# Patient Record
Sex: MALE | Race: OTHER | Employment: FULL TIME | URBAN - METROPOLITAN AREA
[De-identification: names, ages, dates, MRNs, and addresses within clinical notes are randomized per-mention and may not be internally consistent; named-entity substitution may affect disease eponyms.]

---

## 2017-01-04 ENCOUNTER — TELEPHONE (OUTPATIENT)
Dept: INTERNAL MEDICINE CLINIC | Facility: CLINIC | Age: 52
End: 2017-01-04

## 2017-01-04 NOTE — TELEPHONE ENCOUNTER
Patient is no longer taking pravastatin he is taking crestor.  Called [facility] and told them to cancel pravastatin

## 2017-01-10 ENCOUNTER — OFFICE VISIT (OUTPATIENT)
Dept: SURGERY | Facility: CLINIC | Age: 52
End: 2017-01-10

## 2017-01-10 VITALS
TEMPERATURE: 98 F | BODY MASS INDEX: 27.63 KG/M2 | WEIGHT: 193 LBS | SYSTOLIC BLOOD PRESSURE: 126 MMHG | RESPIRATION RATE: 16 BRPM | DIASTOLIC BLOOD PRESSURE: 82 MMHG | HEART RATE: 77 BPM | HEIGHT: 70 IN

## 2017-01-10 DIAGNOSIS — K40.90 RIGHT INGUINAL HERNIA: Primary | ICD-10-CM

## 2017-01-10 PROCEDURE — 99024 POSTOP FOLLOW-UP VISIT: CPT | Performed by: SURGERY
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
## 2017-01-23 ENCOUNTER — OFFICE VISIT (OUTPATIENT)
Dept: SURGERY | Facility: CLINIC | Age: 52
End: 2017-01-23

## 2017-01-23 VITALS
RESPIRATION RATE: 16 BRPM | BODY MASS INDEX: 27.63 KG/M2 | HEIGHT: 70 IN | DIASTOLIC BLOOD PRESSURE: 79 MMHG | HEART RATE: 85 BPM | SYSTOLIC BLOOD PRESSURE: 121 MMHG | WEIGHT: 193 LBS

## 2017-01-23 DIAGNOSIS — K40.90 RIGHT INGUINAL HERNIA: Primary | ICD-10-CM

## 2017-01-23 PROCEDURE — 99024 POSTOP FOLLOW-UP VISIT: CPT | Performed by: SURGERY
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
## 2017-02-03 ENCOUNTER — OFFICE VISIT (OUTPATIENT)
Dept: SURGERY | Facility: CLINIC | Age: 52
End: 2017-02-03

## 2017-02-03 VITALS
DIASTOLIC BLOOD PRESSURE: 86 MMHG | SYSTOLIC BLOOD PRESSURE: 130 MMHG | RESPIRATION RATE: 16 BRPM | WEIGHT: 193 LBS | BODY MASS INDEX: 27.63 KG/M2 | TEMPERATURE: 98 F | HEART RATE: 84 BPM | HEIGHT: 70 IN

## 2017-02-03 DIAGNOSIS — K40.90 RIGHT INGUINAL HERNIA: Primary | ICD-10-CM

## 2017-02-03 PROCEDURE — 99024 POSTOP FOLLOW-UP VISIT: CPT | Performed by: SURGERY
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
## 2017-05-11 ENCOUNTER — OFFICE VISIT (OUTPATIENT)
Dept: INTERNAL MEDICINE CLINIC | Facility: CLINIC | Age: 52
End: 2017-05-11

## 2017-05-11 ENCOUNTER — LAB ENCOUNTER (OUTPATIENT)
Dept: LAB | Age: 52
End: 2017-05-11
Attending: INTERNAL MEDICINE
Payer: COMMERCIAL

## 2017-05-11 ENCOUNTER — HOSPITAL ENCOUNTER (OUTPATIENT)
Dept: GENERAL RADIOLOGY | Age: 52
Discharge: HOME | End: 2017-05-11
Attending: INTERNAL MEDICINE
Payer: COMMERCIAL

## 2017-05-11 VITALS
TEMPERATURE: 98 F | DIASTOLIC BLOOD PRESSURE: 74 MMHG | BODY MASS INDEX: 27.63 KG/M2 | SYSTOLIC BLOOD PRESSURE: 134 MMHG | RESPIRATION RATE: 16 BRPM | HEART RATE: 80 BPM | HEIGHT: 70 IN | WEIGHT: 193 LBS

## 2017-05-11 DIAGNOSIS — I10 ESSENTIAL HYPERTENSION: ICD-10-CM

## 2017-05-11 DIAGNOSIS — E78.00 PURE HYPERCHOLESTEROLEMIA: ICD-10-CM

## 2017-05-11 DIAGNOSIS — R07.89 OTHER CHEST PAIN: ICD-10-CM

## 2017-05-11 DIAGNOSIS — R00.2 PALPITATIONS: ICD-10-CM

## 2017-05-11 DIAGNOSIS — I10 ESSENTIAL HYPERTENSION: Primary | ICD-10-CM

## 2017-05-11 PROCEDURE — 99214 OFFICE O/P EST MOD 30 MIN: CPT | Performed by: INTERNAL MEDICINE

## 2017-05-11 PROCEDURE — 71020 XR CHEST PA + LAT CHEST (CPT=71020): CPT | Performed by: INTERNAL MEDICINE

## 2017-05-11 PROCEDURE — 80053 COMPREHEN METABOLIC PANEL: CPT

## 2017-05-11 PROCEDURE — 85025 COMPLETE CBC W/AUTO DIFF WBC: CPT

## 2017-05-11 PROCEDURE — 93000 ELECTROCARDIOGRAM COMPLETE: CPT | Performed by: INTERNAL MEDICINE

## 2017-05-11 PROCEDURE — 83735 ASSAY OF MAGNESIUM: CPT

## 2017-05-11 NOTE — PROGRESS NOTES
Patient presents with:  Chest Pressure: and muscle spams going on for 1 week on and off.        HPI:  Here for vague chest discomfort started after working out, doing a plank and reaching back with his right arm, has mild right upper pectoral and sternal di Eyes: Conjunctivae wnl. Neck: Normal range of motion. Neck supple. Normal carotid pulses. No masses. Cardiovascular: Normal rate, regular rhythm and intact distal pulses. No murmur, rubs or gallops.    Pulmonary/Chest: Effort normal and breath sounds

## 2017-05-12 ENCOUNTER — TELEPHONE (OUTPATIENT)
Dept: INTERNAL MEDICINE CLINIC | Facility: CLINIC | Age: 52
End: 2017-05-12

## 2017-05-12 NOTE — TELEPHONE ENCOUNTER
Called pt to inform noted CARD ECHO 2D DOPPLER- referral #[id number]- AUTHORIZED. Spoke with Jesus Rodriguez in Referral and advised no pre-cert initiated for CARD MONITOR HOLTER 24 HOUR due to not needed for this procedure. Pt verbalized understanding.

## 2017-06-09 ENCOUNTER — TELEPHONE (OUTPATIENT)
Dept: INTERNAL MEDICINE CLINIC | Facility: CLINIC | Age: 52
End: 2017-06-09

## 2017-06-09 NOTE — TELEPHONE ENCOUNTER
Order form for compression socks/orthopedic footwear was received from Kareem Waters. Left detailed message ok per Hipaa to have pt cb and confirm he requested compression socks and orthopedic footwear.

## 2017-06-12 NOTE — TELEPHONE ENCOUNTER
Pt called back and stated this is a renewal. Yes he needs the compression socks and orthopedic footwear.

## 2017-08-31 ENCOUNTER — HOSPITAL ENCOUNTER (OUTPATIENT)
Dept: CV DIAGNOSTICS | Facility: HOSPITAL | Age: 52
Discharge: HOME | End: 2017-08-31
Attending: INTERNAL MEDICINE
Payer: COMMERCIAL

## 2017-08-31 DIAGNOSIS — R07.89 OTHER CHEST PAIN: ICD-10-CM

## 2017-08-31 DIAGNOSIS — R00.2 PALPITATIONS: ICD-10-CM

## 2017-08-31 DIAGNOSIS — I10 ESSENTIAL HYPERTENSION: ICD-10-CM

## 2017-08-31 DIAGNOSIS — E78.00 PURE HYPERCHOLESTEROLEMIA: ICD-10-CM

## 2017-08-31 PROCEDURE — 93306 TTE W/DOPPLER COMPLETE: CPT | Performed by: INTERNAL MEDICINE

## 2017-08-31 PROCEDURE — 93226 XTRNL ECG REC<48 HR SCAN A/R: CPT | Performed by: INTERNAL MEDICINE

## 2017-08-31 PROCEDURE — 93225 XTRNL ECG REC<48 HRS REC: CPT | Performed by: INTERNAL MEDICINE

## 2017-08-31 PROCEDURE — 93227 XTRNL ECG REC<48 HR R&I: CPT | Performed by: INTERNAL MEDICINE

## 2017-09-08 ENCOUNTER — OFFICE VISIT (OUTPATIENT)
Dept: INTERNAL MEDICINE CLINIC | Facility: CLINIC | Age: 52
End: 2017-09-08

## 2017-09-08 VITALS
TEMPERATURE: 98 F | DIASTOLIC BLOOD PRESSURE: 80 MMHG | RESPIRATION RATE: 16 BRPM | WEIGHT: 196.38 LBS | BODY MASS INDEX: 28.12 KG/M2 | SYSTOLIC BLOOD PRESSURE: 132 MMHG | HEIGHT: 70 IN | HEART RATE: 86 BPM

## 2017-09-08 DIAGNOSIS — Z12.5 SCREENING PSA (PROSTATE SPECIFIC ANTIGEN): ICD-10-CM

## 2017-09-08 DIAGNOSIS — E78.00 PURE HYPERCHOLESTEROLEMIA: Primary | ICD-10-CM

## 2017-09-08 DIAGNOSIS — R07.89 STERNAL PAIN: ICD-10-CM

## 2017-09-08 DIAGNOSIS — R73.9 HYPERGLYCEMIA: ICD-10-CM

## 2017-09-08 DIAGNOSIS — I10 ESSENTIAL HYPERTENSION: ICD-10-CM

## 2017-09-08 DIAGNOSIS — Z00.00 BLOOD TESTS FOR ROUTINE GENERAL PHYSICAL EXAMINATION: ICD-10-CM

## 2017-09-08 PROCEDURE — 99213 OFFICE O/P EST LOW 20 MIN: CPT | Performed by: INTERNAL MEDICINE

## 2017-09-08 PROCEDURE — 90471 IMMUNIZATION ADMIN: CPT | Performed by: INTERNAL MEDICINE

## 2017-09-08 PROCEDURE — 90686 IIV4 VACC NO PRSV 0.5 ML IM: CPT | Performed by: INTERNAL MEDICINE

## 2017-09-08 RX ORDER — NAPROXEN 500 MG/1
500 TABLET ORAL 2 TIMES DAILY WITH MEALS
Qty: 20 TABLET | Refills: 0 | Status: SHIPPED | OUTPATIENT
Start: 2017-09-08 | End: 2018-03-07 | Stop reason: ALTCHOICE

## 2017-09-08 NOTE — PROGRESS NOTES
Patient presents with: Follow - Up: ROOM 8/ discuss echo results/ left knee pain x1-2 months- overexerted on treadmill/ flu shot given      HPI:  Here for f/u high chol, stable dongk well taking meds no se's.  Pt notes persitent mild pain at sternum, neg c 28.18 kg/m²   Constitutional: Oriented to person, place, and time. No distress. HEENT:  Normocephalic and atraumatic. TM's wnl. Nose normal. Oropharynx is clear and moist.   Eyes: Conjunctivae wnl. Neck: Normal range of motion. Neck supple.  Normal parrish

## 2017-09-13 ENCOUNTER — HOSPITAL ENCOUNTER (OUTPATIENT)
Dept: GENERAL RADIOLOGY | Age: 52
Discharge: HOME | End: 2017-09-13
Attending: INTERNAL MEDICINE
Payer: COMMERCIAL

## 2017-09-13 ENCOUNTER — LAB ENCOUNTER (OUTPATIENT)
Dept: LAB | Age: 52
End: 2017-09-13
Attending: INTERNAL MEDICINE
Payer: COMMERCIAL

## 2017-09-13 DIAGNOSIS — Z12.5 SCREENING PSA (PROSTATE SPECIFIC ANTIGEN): ICD-10-CM

## 2017-09-13 DIAGNOSIS — Z00.00 BLOOD TESTS FOR ROUTINE GENERAL PHYSICAL EXAMINATION: ICD-10-CM

## 2017-09-13 DIAGNOSIS — R73.9 HYPERGLYCEMIA: ICD-10-CM

## 2017-09-13 DIAGNOSIS — E78.00 PURE HYPERCHOLESTEROLEMIA: ICD-10-CM

## 2017-09-13 DIAGNOSIS — I10 ESSENTIAL HYPERTENSION: ICD-10-CM

## 2017-09-13 DIAGNOSIS — R07.89 STERNAL PAIN: ICD-10-CM

## 2017-09-13 LAB
ALBUMIN SERPL-MCNC: 4 G/DL (ref 3.5–4.8)
ALP LIVER SERPL-CCNC: 81 U/L (ref 45–117)
ALT SERPL-CCNC: 33 U/L (ref 17–63)
AST SERPL-CCNC: 20 U/L (ref 15–41)
BASOPHILS # BLD AUTO: 0.09 X10(3) UL (ref 0–0.1)
BASOPHILS NFR BLD AUTO: 1.1 %
BILIRUB SERPL-MCNC: 0.5 MG/DL (ref 0.1–2)
BUN BLD-MCNC: 19 MG/DL (ref 8–20)
CALCIUM BLD-MCNC: 8.9 MG/DL (ref 8.3–10.3)
CHLORIDE: 107 MMOL/L (ref 101–111)
CHOLEST SMN-MCNC: 149 MG/DL (ref ?–200)
CO2: 25 MMOL/L (ref 22–32)
COMPLEXED PSA SERPL-MCNC: 0.64 NG/ML (ref 0.01–4)
CREAT BLD-MCNC: 0.9 MG/DL (ref 0.7–1.3)
EOSINOPHIL # BLD AUTO: 0.33 X10(3) UL (ref 0–0.3)
EOSINOPHIL NFR BLD AUTO: 4.1 %
ERYTHROCYTE [DISTWIDTH] IN BLOOD BY AUTOMATED COUNT: 13 % (ref 11.5–16)
EST. AVERAGE GLUCOSE BLD GHB EST-MCNC: 128 MG/DL (ref 68–126)
GLUCOSE BLD-MCNC: 89 MG/DL (ref 70–99)
HBA1C MFR BLD HPLC: 6.1 % (ref ?–5.7)
HCT VFR BLD AUTO: 45.4 % (ref 37–53)
HDLC SERPL-MCNC: 52 MG/DL (ref 45–?)
HDLC SERPL: 2.87 {RATIO} (ref ?–4.97)
HGB BLD-MCNC: 15.4 G/DL (ref 13–17)
IMMATURE GRANULOCYTE COUNT: 0.04 X10(3) UL (ref 0–1)
IMMATURE GRANULOCYTE RATIO %: 0.5 %
LDLC SERPL CALC-MCNC: 58 MG/DL (ref ?–130)
LDLC SERPL-MCNC: 39 MG/DL (ref 5–40)
LYMPHOCYTES # BLD AUTO: 2.11 X10(3) UL (ref 0.9–4)
LYMPHOCYTES NFR BLD AUTO: 26.4 %
M PROTEIN MFR SERPL ELPH: 7.4 G/DL (ref 6.1–8.3)
MCH RBC QN AUTO: 30.1 PG (ref 27–33.2)
MCHC RBC AUTO-ENTMCNC: 33.9 G/DL (ref 31–37)
MCV RBC AUTO: 88.7 FL (ref 80–99)
MONOCYTES # BLD AUTO: 0.63 X10(3) UL (ref 0.1–0.6)
MONOCYTES NFR BLD AUTO: 7.9 %
NEUTROPHIL ABS PRELIM: 4.8 X10 (3) UL (ref 1.3–6.7)
NEUTROPHILS # BLD AUTO: 4.8 X10(3) UL (ref 1.3–6.7)
NEUTROPHILS NFR BLD AUTO: 60 %
NONHDLC SERPL-MCNC: 97 MG/DL (ref ?–130)
PLATELET # BLD AUTO: 259 10(3)UL (ref 150–450)
POTASSIUM SERPL-SCNC: 3.9 MMOL/L (ref 3.6–5.1)
RBC # BLD AUTO: 5.12 X10(6)UL (ref 4.3–5.7)
RED CELL DISTRIBUTION WIDTH-SD: 42.5 FL (ref 35.1–46.3)
SODIUM SERPL-SCNC: 140 MMOL/L (ref 136–144)
TRIGLYCERIDES: 196 MG/DL (ref ?–150)
TSI SER-ACNC: 2.36 MIU/ML (ref 0.35–5.5)
WBC # BLD AUTO: 8 X10(3) UL (ref 4–13)

## 2017-09-13 PROCEDURE — 80053 COMPREHEN METABOLIC PANEL: CPT

## 2017-09-13 PROCEDURE — 71120 X-RAY EXAM BREASTBONE 2/>VWS: CPT | Performed by: INTERNAL MEDICINE

## 2017-09-13 PROCEDURE — 85025 COMPLETE CBC W/AUTO DIFF WBC: CPT

## 2017-09-13 PROCEDURE — 36415 COLL VENOUS BLD VENIPUNCTURE: CPT

## 2017-09-13 PROCEDURE — 83036 HEMOGLOBIN GLYCOSYLATED A1C: CPT

## 2017-09-13 PROCEDURE — 80061 LIPID PANEL: CPT

## 2017-09-13 PROCEDURE — 84443 ASSAY THYROID STIM HORMONE: CPT

## 2017-09-14 DIAGNOSIS — R73.9 HYPERGLYCEMIA: ICD-10-CM

## 2017-09-14 DIAGNOSIS — E78.00 PURE HYPERCHOLESTEROLEMIA: Primary | ICD-10-CM

## 2017-09-14 DIAGNOSIS — R07.89 STERNAL PAIN: Primary | ICD-10-CM

## 2017-09-14 NOTE — PROGRESS NOTES
Trigs and sugar up a bit. Needs to improved diet, lower fat and sugar/carbs.  Rpt cmp, lipid a1c in 6mos

## 2017-09-20 RX ORDER — PRAVASTATIN SODIUM 20 MG
TABLET ORAL
Qty: 90 TABLET | Refills: 0 | Status: SHIPPED | OUTPATIENT
Start: 2017-09-20 | End: 2017-11-30

## 2017-09-25 ENCOUNTER — TELEPHONE (OUTPATIENT)
Dept: INTERNAL MEDICINE CLINIC | Facility: CLINIC | Age: 52
End: 2017-09-25

## 2017-09-25 DIAGNOSIS — Z87.19 HISTORY OF DIVERTICULOSIS: ICD-10-CM

## 2017-09-25 DIAGNOSIS — Z87.19 HISTORY OF HEMORRHOIDS: ICD-10-CM

## 2017-09-25 DIAGNOSIS — Z12.11 SCREEN FOR COLON CANCER: Primary | ICD-10-CM

## 2017-09-25 NOTE — TELEPHONE ENCOUNTER
Patient notified CT Chest denied. Pt states he is still having the intermittent pain, finished the Naprosyn x 10 days without any change, is still working out 2-4 days per week, low impact, lifting weights above head but nothing above 15 lbs per arm.   Pt

## 2017-09-25 NOTE — TELEPHONE ENCOUNTER
Talbert Simmonds, MD - FW: Peer to peer << Less Detail',event)\" href=\"javascript:;\">More Detail >>      FW: Peer to peer    Talbert Simmonds, MD   Sent: Mon September 25, 2017  7:34 AM   To: [facility] Clinical Staff                  Message     CT was bladimir

## 2017-11-30 ENCOUNTER — OFFICE VISIT (OUTPATIENT)
Dept: SURGERY | Facility: CLINIC | Age: 52
End: 2017-11-30

## 2017-11-30 VITALS
DIASTOLIC BLOOD PRESSURE: 73 MMHG | HEART RATE: 68 BPM | HEIGHT: 70 IN | TEMPERATURE: 99 F | WEIGHT: 196 LBS | BODY MASS INDEX: 28.06 KG/M2 | SYSTOLIC BLOOD PRESSURE: 110 MMHG

## 2017-11-30 DIAGNOSIS — K57.30 DIVERTICULOSIS OF LARGE INTESTINE WITHOUT HEMORRHAGE: ICD-10-CM

## 2017-11-30 DIAGNOSIS — Z12.11 ENCOUNTER FOR SCREENING COLONOSCOPY: Primary | ICD-10-CM

## 2017-11-30 PROCEDURE — 99244 OFF/OP CNSLTJ NEW/EST MOD 40: CPT | Performed by: COLON & RECTAL SURGERY

## 2017-11-30 RX ORDER — POLYETHYLENE GLYCOL 3350, SODIUM CHLORIDE, SODIUM BICARBONATE, POTASSIUM CHLORIDE 420; 11.2; 5.72; 1.48 G/4L; G/4L; G/4L; G/4L
POWDER, FOR SOLUTION ORAL
Qty: 1 BOTTLE | Refills: 0 | Status: SHIPPED | OUTPATIENT
Start: 2017-11-30 | End: 2018-01-18 | Stop reason: ALTCHOICE

## 2017-11-30 RX ORDER — LISINOPRIL 5 MG/1
5 TABLET ORAL DAILY
COMMUNITY
Start: 2017-11-15 | End: 2018-03-02

## 2017-11-30 NOTE — PATIENT INSTRUCTIONS
Assessment   Encounter for screening colonoscopy  (primary encounter diagnosis)  Diverticulosis of large intestine without hemorrhage      Plan   The benefits of colonoscopy, including detection of cancer and polyp removal prior to progression to cancer we
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
## 2017-12-08 ENCOUNTER — TELEPHONE (OUTPATIENT)
Dept: INTERNAL MEDICINE CLINIC | Facility: CLINIC | Age: 52
End: 2017-12-08

## 2017-12-19 ENCOUNTER — LABORATORY ENCOUNTER (OUTPATIENT)
Dept: LAB | Age: 52
End: 2017-12-19
Attending: COLON & RECTAL SURGERY
Payer: COMMERCIAL

## 2017-12-19 DIAGNOSIS — Z12.11 COLON CANCER SCREENING: Primary | ICD-10-CM

## 2017-12-19 PROCEDURE — 88305 TISSUE EXAM BY PATHOLOGIST: CPT

## 2018-01-15 ENCOUNTER — MED REC SCAN ONLY (OUTPATIENT)
Dept: INTERNAL MEDICINE CLINIC | Facility: CLINIC | Age: 53
End: 2018-01-15

## 2018-01-19 ENCOUNTER — LAB ENCOUNTER (OUTPATIENT)
Dept: LAB | Age: 53
End: 2018-01-19
Attending: INTERNAL MEDICINE
Payer: COMMERCIAL

## 2018-01-19 ENCOUNTER — OFFICE VISIT (OUTPATIENT)
Dept: INTERNAL MEDICINE CLINIC | Facility: CLINIC | Age: 53
End: 2018-01-19

## 2018-01-19 ENCOUNTER — TELEPHONE (OUTPATIENT)
Dept: INTERNAL MEDICINE CLINIC | Facility: CLINIC | Age: 53
End: 2018-01-19

## 2018-01-19 VITALS
RESPIRATION RATE: 16 BRPM | DIASTOLIC BLOOD PRESSURE: 80 MMHG | HEART RATE: 83 BPM | SYSTOLIC BLOOD PRESSURE: 120 MMHG | TEMPERATURE: 98 F | WEIGHT: 192.81 LBS | OXYGEN SATURATION: 99 % | BODY MASS INDEX: 27.6 KG/M2 | HEIGHT: 70 IN

## 2018-01-19 DIAGNOSIS — E78.00 PURE HYPERCHOLESTEROLEMIA: ICD-10-CM

## 2018-01-19 DIAGNOSIS — I10 ESSENTIAL HYPERTENSION: ICD-10-CM

## 2018-01-19 DIAGNOSIS — M19.012 ARTHRITIS OF LEFT SHOULDER REGION: Primary | ICD-10-CM

## 2018-01-19 DIAGNOSIS — Z01.818 PRE-OP EXAMINATION: ICD-10-CM

## 2018-01-19 LAB
BUN BLD-MCNC: 15 MG/DL (ref 8–20)
CALCIUM BLD-MCNC: 8.9 MG/DL (ref 8.3–10.3)
CHLORIDE: 107 MMOL/L (ref 101–111)
CO2: 25 MMOL/L (ref 22–32)
CREAT BLD-MCNC: 0.97 MG/DL (ref 0.7–1.3)
GLUCOSE BLD-MCNC: 92 MG/DL (ref 70–99)
POTASSIUM SERPL-SCNC: 3.7 MMOL/L (ref 3.6–5.1)
SODIUM SERPL-SCNC: 140 MMOL/L (ref 136–144)

## 2018-01-19 PROCEDURE — 93000 ELECTROCARDIOGRAM COMPLETE: CPT | Performed by: INTERNAL MEDICINE

## 2018-01-19 PROCEDURE — 80048 BASIC METABOLIC PNL TOTAL CA: CPT

## 2018-01-19 PROCEDURE — 99214 OFFICE O/P EST MOD 30 MIN: CPT | Performed by: INTERNAL MEDICINE
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
## 2018-01-22 ENCOUNTER — ANESTHESIA EVENT (OUTPATIENT)
Dept: SURGERY | Facility: HOSPITAL | Age: 53
End: 2018-01-22
Payer: OTHER MISCELLANEOUS

## 2018-01-23 ENCOUNTER — SURGERY (OUTPATIENT)
Age: 53
End: 2018-01-23

## 2018-01-23 ENCOUNTER — ANESTHESIA (OUTPATIENT)
Dept: SURGERY | Facility: HOSPITAL | Age: 53
End: 2018-01-23
Payer: OTHER MISCELLANEOUS

## 2018-01-23 ENCOUNTER — HOSPITAL ENCOUNTER (OUTPATIENT)
Facility: HOSPITAL | Age: 53
Setting detail: HOSPITAL OUTPATIENT SURGERY
Discharge: HOME | End: 2018-01-23
Attending: ORTHOPAEDIC SURGERY | Admitting: ORTHOPAEDIC SURGERY
Payer: OTHER MISCELLANEOUS

## 2018-01-23 VITALS
HEIGHT: 70 IN | DIASTOLIC BLOOD PRESSURE: 82 MMHG | BODY MASS INDEX: 27.46 KG/M2 | HEART RATE: 85 BPM | RESPIRATION RATE: 16 BRPM | WEIGHT: 191.81 LBS | SYSTOLIC BLOOD PRESSURE: 125 MMHG | OXYGEN SATURATION: 98 % | TEMPERATURE: 98 F

## 2018-01-23 PROCEDURE — 3E0T3BZ INTRODUCTION OF ANESTHETIC AGENT INTO PERIPHERAL NERVES AND PLEXI, PERCUTANEOUS APPROACH: ICD-10-PCS | Performed by: ANESTHESIOLOGY

## 2018-01-23 PROCEDURE — 76942 ECHO GUIDE FOR BIOPSY: CPT | Performed by: ORTHOPAEDIC SURGERY

## 2018-01-23 PROCEDURE — 0PBB4ZZ EXCISION OF LEFT CLAVICLE, PERCUTANEOUS ENDOSCOPIC APPROACH: ICD-10-PCS | Performed by: ORTHOPAEDIC SURGERY

## 2018-01-23 PROCEDURE — 0RNK4ZZ RELEASE LEFT SHOULDER JOINT, PERCUTANEOUS ENDOSCOPIC APPROACH: ICD-10-PCS | Performed by: ORTHOPAEDIC SURGERY

## 2018-01-23 RX ORDER — NALOXONE HYDROCHLORIDE 0.4 MG/ML
80 INJECTION, SOLUTION INTRAMUSCULAR; INTRAVENOUS; SUBCUTANEOUS AS NEEDED
Status: DISCONTINUED | OUTPATIENT
Start: 2018-01-23 | End: 2018-01-23

## 2018-01-23 RX ORDER — POVIDONE-IODINE 10 MG/G
OINTMENT TOPICAL AS NEEDED
Status: DISCONTINUED | OUTPATIENT
Start: 2018-01-23 | End: 2018-01-23 | Stop reason: HOSPADM

## 2018-01-23 RX ORDER — HYDROCODONE BITARTRATE AND ACETAMINOPHEN 5; 325 MG/1; MG/1
1 TABLET ORAL AS NEEDED
Status: DISCONTINUED | OUTPATIENT
Start: 2018-01-23 | End: 2018-01-23

## 2018-01-23 RX ORDER — HYDROCODONE BITARTRATE AND ACETAMINOPHEN 5; 325 MG/1; MG/1
2 TABLET ORAL AS NEEDED
Status: DISCONTINUED | OUTPATIENT
Start: 2018-01-23 | End: 2018-01-23

## 2018-01-23 RX ORDER — HYDROMORPHONE HYDROCHLORIDE 1 MG/ML
0.4 INJECTION, SOLUTION INTRAMUSCULAR; INTRAVENOUS; SUBCUTANEOUS EVERY 5 MIN PRN
Status: DISCONTINUED | OUTPATIENT
Start: 2018-01-23 | End: 2018-01-23

## 2018-01-23 RX ORDER — ONDANSETRON 2 MG/ML
4 INJECTION INTRAMUSCULAR; INTRAVENOUS AS NEEDED
Status: DISCONTINUED | OUTPATIENT
Start: 2018-01-23 | End: 2018-01-23

## 2018-01-23 RX ORDER — MEPERIDINE HYDROCHLORIDE 25 MG/ML
12.5 INJECTION INTRAMUSCULAR; INTRAVENOUS; SUBCUTANEOUS AS NEEDED
Status: DISCONTINUED | OUTPATIENT
Start: 2018-01-23 | End: 2018-01-23

## 2018-01-23 RX ORDER — CEFAZOLIN SODIUM 1 G/3ML
INJECTION, POWDER, FOR SOLUTION INTRAMUSCULAR; INTRAVENOUS
Status: DISCONTINUED | OUTPATIENT
Start: 2018-01-23 | End: 2018-01-23 | Stop reason: HOSPADM

## 2018-01-23 RX ORDER — ACETAMINOPHEN 500 MG
1000 TABLET ORAL ONCE AS NEEDED
Status: DISCONTINUED | OUTPATIENT
Start: 2018-01-23 | End: 2018-01-23

## 2018-01-23 RX ORDER — CEFAZOLIN SODIUM/WATER 2 G/20 ML
2 SYRINGE (ML) INTRAVENOUS ONCE
Status: DISCONTINUED | OUTPATIENT
Start: 2018-01-23 | End: 2018-01-23 | Stop reason: HOSPADM

## 2018-01-23 RX ORDER — SODIUM CHLORIDE, SODIUM LACTATE, POTASSIUM CHLORIDE, CALCIUM CHLORIDE 600; 310; 30; 20 MG/100ML; MG/100ML; MG/100ML; MG/100ML
INJECTION, SOLUTION INTRAVENOUS CONTINUOUS
Status: DISCONTINUED | OUTPATIENT
Start: 2018-01-23 | End: 2018-01-23

## 2018-01-23 RX ORDER — MIDAZOLAM HYDROCHLORIDE 1 MG/ML
1 INJECTION INTRAMUSCULAR; INTRAVENOUS EVERY 5 MIN PRN
Status: DISCONTINUED | OUTPATIENT
Start: 2018-01-23 | End: 2018-01-23
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
## 2018-01-23 NOTE — H&P
Orthopaedic H&P Update    H&P performed by Dr Krystal Payne on 1/19/18 was reviewed by Arianna Pearson MD on 1/23/2018, the patient was examined and no significant changes have occurred in the patient's condition since the H&P was performed.   Risks and benefits w

## 2018-01-23 NOTE — ANESTHESIA POSTPROCEDURE EVALUATION
[facility] Patient Status:  Hospital Outpatient Surgery   Age/Gender 47year old male MRN [id number]   [facility] SURGERY Attending Keri Gonsalves MD   Hosp Day # 0 PCP Marilu Swift MD       Anesthesia Post-op Note

## 2018-01-23 NOTE — BRIEF OP NOTE
Pre-Operative Diagnosis: LEFT SHOULDER SLAP TEAR, IMPINGEMENT, AC JOINT ARTHRITIS       Post-Operative Diagnosis: TYPE 1 SUPERIOR AND ANTERIOR LABRIUM TEAR, A/C JOINT ARTHRITIS, ROTATOR CUFF IMPINGEMENT     Procedure Performed:   Procedure(s):  LEFT MO

## 2018-01-24 NOTE — OPERATIVE REPORT
[facility]    PATIENT'S NAME: Nida Rodriguez   ATTENDING PHYSICIAN: Quinn Das M.D. OPERATING PHYSICIAN: Quinn Das M.D.    PATIENT ACCOUNT#:   [de-identified]    LOCATION:  PREOPASCC EH PRE ASCC 2 EDWP 10  MEDICAL RECORD #:   [id number]       DATE OF therefore we discussed options for arthroscopic treatment. We discussed the nature of the procedure along with its typical postoperative course.   Risks, benefits, and alternatives of surgery were discussed, including but not limited to possible infection, posterior to the biceps there was no significant peel-off but only type 1 labral involvement.   A 4.0 synovial resector was therefore used to debride the labrum to a stable surface superiorly, anteriorly, and extending posteriorly only to about the 1 o'cloc with a CoolPulse VAPR device exposing the anterior third of the acromion. Based on the MRI as a template, I used the anterolateral cutting block technique to debride about 4 mm of acromion laterally and about 6 mm medially where it was most prominent.   Edin Reeves

## 2018-02-02 ENCOUNTER — TELEPHONE (OUTPATIENT)
Dept: INTERNAL MEDICINE CLINIC | Facility: CLINIC | Age: 53
End: 2018-02-02

## 2018-02-02 ENCOUNTER — OFFICE VISIT (OUTPATIENT)
Dept: INTERNAL MEDICINE CLINIC | Facility: CLINIC | Age: 53
End: 2018-02-02

## 2018-02-02 VITALS
RESPIRATION RATE: 16 BRPM | TEMPERATURE: 99 F | WEIGHT: 194 LBS | SYSTOLIC BLOOD PRESSURE: 126 MMHG | DIASTOLIC BLOOD PRESSURE: 78 MMHG | HEART RATE: 66 BPM | BODY MASS INDEX: 28.73 KG/M2 | HEIGHT: 69 IN

## 2018-02-02 DIAGNOSIS — H01.004 BLEPHARITIS OF LEFT UPPER EYELID, UNSPECIFIED TYPE: Primary | ICD-10-CM

## 2018-02-02 PROCEDURE — 99213 OFFICE O/P EST LOW 20 MIN: CPT | Performed by: INTERNAL MEDICINE

## 2018-02-02 RX ORDER — HYDROCODONE BITARTRATE AND ACETAMINOPHEN 5; 325 MG/1; MG/1
TABLET ORAL
Refills: 0 | COMMUNITY
Start: 2018-01-23 | End: 2018-03-07 | Stop reason: ALTCHOICE

## 2018-02-02 RX ORDER — TOBRAMYCIN 3 MG/ML
2 SOLUTION/ DROPS OPHTHALMIC 3 TIMES DAILY
Qty: 5 ML | Refills: 0 | Status: SHIPPED | OUTPATIENT
Start: 2018-02-02 | End: 2018-10-16

## 2018-02-02 NOTE — PROGRESS NOTES
Patient presents with:  Pink Eye: ([id number])  left eye      HPI:  Here for redness itching ans omse clear discharge from left eye for one day. Swelling of the upper lid of left eye. Review of Systems   No f/c/chest pain or sob. No cough.  No abd pain/n/v/d Location: Right arm, Patient Position: Sitting, Cuff Size: large)   Pulse 66   Temp 98.5 °F (36.9 °C) (Oral)   Resp 16   Ht 69\"   Wt 194 lb   BMI 28.65 kg/m²   Constitutional: Oriented to person, place, and time. No distress.    HEENT:  Normocephalic and a

## 2018-02-24 RX ORDER — ROSUVASTATIN CALCIUM 10 MG/1
TABLET, COATED ORAL
Qty: 90 TABLET | Refills: 3 | Status: SHIPPED | OUTPATIENT
Start: 2018-02-24 | End: 2019-02-26

## 2018-03-02 RX ORDER — LISINOPRIL 5 MG/1
TABLET ORAL
Qty: 90 TABLET | Refills: 1 | Status: SHIPPED | OUTPATIENT
Start: 2018-03-02 | End: 2018-09-12

## 2018-03-06 ENCOUNTER — LAB ENCOUNTER (OUTPATIENT)
Dept: LAB | Age: 53
End: 2018-03-06
Attending: INTERNAL MEDICINE
Payer: COMMERCIAL

## 2018-03-06 DIAGNOSIS — E78.00 PURE HYPERCHOLESTEROLEMIA: ICD-10-CM

## 2018-03-06 DIAGNOSIS — R73.9 HYPERGLYCEMIA: ICD-10-CM

## 2018-03-06 LAB
ALBUMIN SERPL-MCNC: 4.3 G/DL (ref 3.5–4.8)
ALP LIVER SERPL-CCNC: 84 U/L (ref 45–117)
ALT SERPL-CCNC: 32 U/L (ref 17–63)
AST SERPL-CCNC: 17 U/L (ref 15–41)
BILIRUB SERPL-MCNC: 0.6 MG/DL (ref 0.1–2)
BUN BLD-MCNC: 19 MG/DL (ref 8–20)
CALCIUM BLD-MCNC: 9.4 MG/DL (ref 8.3–10.3)
CHLORIDE: 105 MMOL/L (ref 101–111)
CHOLEST SMN-MCNC: 156 MG/DL (ref ?–200)
CO2: 25 MMOL/L (ref 22–32)
CREAT BLD-MCNC: 0.91 MG/DL (ref 0.7–1.3)
EST. AVERAGE GLUCOSE BLD GHB EST-MCNC: 120 MG/DL (ref 68–126)
GLUCOSE BLD-MCNC: 92 MG/DL (ref 70–99)
HBA1C MFR BLD HPLC: 5.8 % (ref ?–5.7)
HDLC SERPL-MCNC: 53 MG/DL (ref 45–?)
HDLC SERPL: 2.94 {RATIO} (ref ?–4.97)
LDLC SERPL CALC-MCNC: 57 MG/DL (ref ?–130)
M PROTEIN MFR SERPL ELPH: 7.7 G/DL (ref 6.1–8.3)
NONHDLC SERPL-MCNC: 103 MG/DL (ref ?–130)
POTASSIUM SERPL-SCNC: 4.1 MMOL/L (ref 3.6–5.1)
SODIUM SERPL-SCNC: 138 MMOL/L (ref 136–144)
TRIGL SERPL-MCNC: 229 MG/DL (ref ?–150)
VLDLC SERPL CALC-MCNC: 46 MG/DL (ref 5–40)

## 2018-03-06 PROCEDURE — 80053 COMPREHEN METABOLIC PANEL: CPT

## 2018-03-06 PROCEDURE — 83036 HEMOGLOBIN GLYCOSYLATED A1C: CPT

## 2018-03-06 PROCEDURE — 80061 LIPID PANEL: CPT

## 2018-03-07 ENCOUNTER — OFFICE VISIT (OUTPATIENT)
Dept: INTERNAL MEDICINE CLINIC | Facility: CLINIC | Age: 53
End: 2018-03-07

## 2018-03-07 VITALS
DIASTOLIC BLOOD PRESSURE: 78 MMHG | BODY MASS INDEX: 28.88 KG/M2 | HEART RATE: 84 BPM | RESPIRATION RATE: 20 BRPM | HEIGHT: 69 IN | TEMPERATURE: 98 F | SYSTOLIC BLOOD PRESSURE: 124 MMHG | WEIGHT: 195 LBS

## 2018-03-07 DIAGNOSIS — R42 VERTIGO: Primary | ICD-10-CM

## 2018-03-07 PROCEDURE — 99213 OFFICE O/P EST LOW 20 MIN: CPT | Performed by: INTERNAL MEDICINE

## 2018-03-07 RX ORDER — MECLIZINE HYDROCHLORIDE 25 MG/1
25 TABLET ORAL 3 TIMES DAILY PRN
Qty: 42 TABLET | Refills: 0 | Status: SHIPPED | OUTPATIENT
Start: 2018-03-07 | End: 2018-03-21
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
## 2018-09-12 RX ORDER — LISINOPRIL 5 MG/1
TABLET ORAL
Qty: 90 TABLET | Refills: 1 | Status: SHIPPED | OUTPATIENT
Start: 2018-09-12 | End: 2019-05-23

## 2018-09-12 NOTE — TELEPHONE ENCOUNTER
Last Office Visit: 3-7-18 with AD for dizziness  Last Rx Filled: 3-2-18 90 tabs with 1 refill  Last Labs: 3-6-18 hga1c/lipid/cmp/  Future Appointment: none    Per protocol to provider

## 2018-10-05 ENCOUNTER — TELEPHONE (OUTPATIENT)
Dept: INTERNAL MEDICINE CLINIC | Facility: CLINIC | Age: 53
End: 2018-10-05

## 2018-10-05 NOTE — TELEPHONE ENCOUNTER
Wife is calling for  and they are wanting to know who [facility] would refer them to for Podiatry.   Please advise

## 2018-10-09 NOTE — TELEPHONE ENCOUNTER
Pt called back stating ingrown toe nails he tried to trim and cannot get to it-both large toes-no infection not bleeding just wants to see someone to help take care of ingrown toe nails-pt  and cannot get to his phone all the time so if you

## 2018-10-16 ENCOUNTER — OFFICE VISIT (OUTPATIENT)
Dept: PODIATRY CLINIC | Facility: CLINIC | Age: 53
End: 2018-10-16
Payer: COMMERCIAL

## 2018-10-16 ENCOUNTER — TELEPHONE (OUTPATIENT)
Dept: INTERNAL MEDICINE CLINIC | Facility: CLINIC | Age: 53
End: 2018-10-16

## 2018-10-16 ENCOUNTER — OFFICE VISIT (OUTPATIENT)
Dept: INTERNAL MEDICINE CLINIC | Facility: CLINIC | Age: 53
End: 2018-10-16
Payer: COMMERCIAL

## 2018-10-16 VITALS
BODY MASS INDEX: 28.38 KG/M2 | DIASTOLIC BLOOD PRESSURE: 80 MMHG | HEART RATE: 96 BPM | WEIGHT: 191.63 LBS | RESPIRATION RATE: 18 BRPM | TEMPERATURE: 99 F | SYSTOLIC BLOOD PRESSURE: 130 MMHG | HEIGHT: 69 IN

## 2018-10-16 DIAGNOSIS — I10 ESSENTIAL HYPERTENSION: ICD-10-CM

## 2018-10-16 DIAGNOSIS — R73.9 HYPERGLYCEMIA: ICD-10-CM

## 2018-10-16 DIAGNOSIS — Z00.00 BLOOD TESTS FOR ROUTINE GENERAL PHYSICAL EXAMINATION: Primary | ICD-10-CM

## 2018-10-16 DIAGNOSIS — Z12.5 SCREENING PSA (PROSTATE SPECIFIC ANTIGEN): ICD-10-CM

## 2018-10-16 DIAGNOSIS — Z13.220 SCREENING CHOLESTEROL LEVEL: ICD-10-CM

## 2018-10-16 DIAGNOSIS — M79.675 PAIN IN TOES OF BOTH FEET: ICD-10-CM

## 2018-10-16 DIAGNOSIS — L60.0 ONYCHOCRYPTOSIS: Primary | ICD-10-CM

## 2018-10-16 DIAGNOSIS — E78.00 PURE HYPERCHOLESTEROLEMIA: ICD-10-CM

## 2018-10-16 DIAGNOSIS — M79.674 PAIN IN TOES OF BOTH FEET: ICD-10-CM

## 2018-10-16 DIAGNOSIS — H00.15 CHALAZION LEFT LOWER EYELID: ICD-10-CM

## 2018-10-16 PROCEDURE — 99203 OFFICE O/P NEW LOW 30 MIN: CPT | Performed by: PODIATRIST

## 2018-10-16 PROCEDURE — 99213 OFFICE O/P EST LOW 20 MIN: CPT | Performed by: INTERNAL MEDICINE

## 2018-10-16 RX ORDER — TOBRAMYCIN 3 MG/ML
2 SOLUTION/ DROPS OPHTHALMIC 3 TIMES DAILY
Qty: 5 ML | Refills: 0 | Status: SHIPPED | OUTPATIENT
Start: 2018-10-16 | End: 2019-06-04 | Stop reason: ALTCHOICE

## 2018-10-16 NOTE — PROGRESS NOTES
Patient presents with:  Eye Problem: cn room 8: stye in eye that appeared on sunday used warm compress not bugging him as bad       HPI:  Here for left lower lid of eye redness with mild swelling, no pain, applying warm packs bid with some improvement. place, and time. No distress. HEENT:  Normocephalic and atraumatic. TM's wnl. Nose normal. Oropharynx is clear and moist. mild chalazion lower lid left eye, red sollen lower lid, mild  Eyes: Conjunctivae wnl. Neck: Normal range of motion. Neck supple.

## 2018-10-16 NOTE — TELEPHONE ENCOUNTER
Pt was in to see [facility] today for a stye in his eye and wants to know if its contagious. Please advise.  Pt states he may not answer the phone so we can leave a message

## 2018-10-16 NOTE — TELEPHONE ENCOUNTER
The drops PT was prescribed at today's appt are too expensive, he was prescribed a less expensive one previously, does not remember when or what the name of it was. Is asking if he could have that one called instead?

## 2018-10-16 NOTE — TELEPHONE ENCOUNTER
Pt was prescribed Azithromycin eye drops which are too expensive. Pt requesting eye drops that were prescribed in the past. I see pt was given Tobramycin on 2/2/18. Script pended. Please advise.

## 2018-10-16 NOTE — PROGRESS NOTES
Fouzia Holman is a 46year old male. Patient presents with:  Consult: Pt is here for a check on ingrown toe nails. At times has pain and he feels that he has been cutting incorrectly. Pain scale now 3 Depends on activity. Denies redness.  Denies swelling [facility] N/A 3/4/2015    Procedure: LUMBAR EPIDURAL;  Surgeon: Charyl Hatchet, MD;  [facility]: [facility]   • FLUOR GID & David Aquino NDL/CATH SPI [facility]  4/28/2015    Procedure: ;  Surgeon: Martir Trivedi MD;  Location: [address] [facility] Diabetes Brother    • Seizure Disorder Brother       Social History    Socioeconomic History      Marital status:       Spouse name: Not on file      Number of children: Not on file      Years of education: Not on file      Highest education level: heartburn  NEURO: denies headaches    EXAM:   There were no vitals taken for this visit. Physical Exam  GENERAL: well developed, well nourished, in no apparent distress  EXTREMITIES:   1.  Integument: The skin on both feet is warm and dry the medial latera

## 2018-10-16 NOTE — TELEPHONE ENCOUNTER
Spoke to patient, notified bacteria could be transferred by direct contact, try not to touch/rub eye and if he does wash hands before touching anything else. Pt verbalizes understanding.

## 2018-10-18 RX ORDER — GENTAMICIN SULFATE 3 MG/ML
1 SOLUTION/ DROPS OPHTHALMIC 3 TIMES DAILY
Qty: 5 ML | Refills: 0 | Status: SHIPPED | OUTPATIENT
Start: 2018-10-18 | End: 2018-10-25

## 2018-10-23 ENCOUNTER — OFFICE VISIT (OUTPATIENT)
Dept: INTERNAL MEDICINE CLINIC | Facility: CLINIC | Age: 53
End: 2018-10-23
Payer: COMMERCIAL

## 2018-10-23 ENCOUNTER — OFFICE VISIT (OUTPATIENT)
Dept: PODIATRY CLINIC | Facility: CLINIC | Age: 53
End: 2018-10-23
Payer: COMMERCIAL

## 2018-10-23 VITALS
SYSTOLIC BLOOD PRESSURE: 138 MMHG | HEART RATE: 82 BPM | TEMPERATURE: 98 F | RESPIRATION RATE: 16 BRPM | WEIGHT: 189.19 LBS | BODY MASS INDEX: 28.02 KG/M2 | HEIGHT: 69 IN | DIASTOLIC BLOOD PRESSURE: 90 MMHG

## 2018-10-23 VITALS — DIASTOLIC BLOOD PRESSURE: 82 MMHG | SYSTOLIC BLOOD PRESSURE: 140 MMHG | HEART RATE: 80 BPM

## 2018-10-23 DIAGNOSIS — L60.0 ONYCHOCRYPTOSIS: Primary | ICD-10-CM

## 2018-10-23 DIAGNOSIS — H00.015 HORDEOLUM EXTERNUM LEFT LOWER EYELID: Primary | ICD-10-CM

## 2018-10-23 DIAGNOSIS — M79.675 PAIN IN TOES OF BOTH FEET: ICD-10-CM

## 2018-10-23 DIAGNOSIS — M79.674 PAIN IN TOES OF BOTH FEET: ICD-10-CM

## 2018-10-23 PROCEDURE — 99213 OFFICE O/P EST LOW 20 MIN: CPT | Performed by: PODIATRIST

## 2018-10-23 PROCEDURE — 99213 OFFICE O/P EST LOW 20 MIN: CPT | Performed by: INTERNAL MEDICINE

## 2018-10-23 PROCEDURE — 11750 EXCISION NAIL&NAIL MATRIX: CPT | Performed by: PODIATRIST

## 2018-10-23 RX ORDER — CEFADROXIL 500 MG/1
500 CAPSULE ORAL 2 TIMES DAILY
Qty: 15 CAPSULE | Refills: 0 | Status: SHIPPED | OUTPATIENT
Start: 2018-10-23 | End: 2019-06-04 | Stop reason: ALTCHOICE

## 2018-10-23 NOTE — PROGRESS NOTES
Per Dr. Artie Person draw up 5cc Marcaine 0.5% for injection to left great toe  Per Dr. Artie Person draw up 5cc Marcaine 0.5% for injection to right great toe

## 2018-10-24 NOTE — PROGRESS NOTES
Ahsan Lieberman  2/3/1965    Patient presents with: Follow - Up: RE rm:1 stye in the L eye thats getting worse       SUBJECTIVE   Ahsan Lieberman is a 46year old male who presents as a follow-up.     Approximately 10 days ago the patient noted a small \"b TABLET BY MOUTH EVERY EVENING Disp: 90 tablet Rfl: 3      No Known Allergies   Past Medical History:   Diagnosis Date   • Acute sinusitis, unspecified    • Anesthesia complication     episodes of awakening   • Back problem    • Chest pain, unspecified    • EPIDURAL;  Surgeon: Shira Verdugo MD;  Location: [address] [place] [address]   • INTRAOPERATIVE NEURO MONITORING N/A 6/1/2015    Performed by Sterling Guerrero MD at [facility] MAIN OR   • [address] Right 12/30/2016    Performed by Spenser rate, regular rhythm and intact distal pulses. No murmur, rubs or gallops. Pulmonary/Chest: Effort normal and breath sounds normal. No respiratory distress. Musculoskeletal: No edema  Neurological: EOMI. PERRLA.   Skin: No rashes    ASSESSMENT AND PLAN
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
## 2018-10-30 ENCOUNTER — OFFICE VISIT (OUTPATIENT)
Dept: PODIATRY CLINIC | Facility: CLINIC | Age: 53
End: 2018-10-30
Payer: COMMERCIAL

## 2018-10-30 DIAGNOSIS — M79.675 PAIN IN TOES OF BOTH FEET: ICD-10-CM

## 2018-10-30 DIAGNOSIS — L60.0 ONYCHOCRYPTOSIS: Primary | ICD-10-CM

## 2018-10-30 DIAGNOSIS — M79.674 PAIN IN TOES OF BOTH FEET: ICD-10-CM

## 2018-10-30 PROCEDURE — 99024 POSTOP FOLLOW-UP VISIT: CPT | Performed by: PODIATRIST

## 2018-10-30 NOTE — PROGRESS NOTES
Lily Martin is a 46year old male. Patient presents with:   Follow - Up: bilateral toe nail procedure, noting moderate drainage post procedure which is getting less, he is noting a slight odor from toes with bandaid change, pain is greatly improved no p Ebony Sanabria MD;  Location: [address] [place] [address] NDL/CATH SPI DX/THER Jess Billingsley  4/28/2015    Procedure: ;  Surgeon: Danita Arora MD;  Location: [address] [place] [address]   • FOREARM/WRIST SURGERY UNLISTED      treatment of fore Socioeconomic History      Marital status:       Spouse name: Not on file      Number of children: Not on file      Years of education: Not on file      Highest education level: Not on file    Social Needs      Financial resource strain: Not on file visit.  Physical Exam  GENERAL: well developed, well nourished, in no apparent distress  EXTREMITIES:   Examination today shows that the nail edges are healing uneventfully there is no erythema no edema there is a serosanguineous drainage which is normal a

## 2018-12-27 ENCOUNTER — NURSE ONLY (OUTPATIENT)
Dept: INTERNAL MEDICINE CLINIC | Facility: CLINIC | Age: 53
End: 2018-12-27
Payer: COMMERCIAL

## 2018-12-27 DIAGNOSIS — Z23 NEED FOR VACCINATION: ICD-10-CM

## 2018-12-27 PROCEDURE — 90471 IMMUNIZATION ADMIN: CPT | Performed by: PHYSICIAN ASSISTANT

## 2018-12-27 PROCEDURE — 90686 IIV4 VACC NO PRSV 0.5 ML IM: CPT | Performed by: PHYSICIAN ASSISTANT

## 2019-02-26 RX ORDER — ROSUVASTATIN CALCIUM 10 MG/1
TABLET, COATED ORAL
Qty: 90 TABLET | Refills: 0 | Status: SHIPPED | OUTPATIENT
Start: 2019-02-26 | End: 2019-06-04

## 2019-04-04 ENCOUNTER — LAB ENCOUNTER (OUTPATIENT)
Dept: LAB | Age: 54
End: 2019-04-04
Attending: INTERNAL MEDICINE
Payer: COMMERCIAL

## 2019-04-04 DIAGNOSIS — R73.9 HYPERGLYCEMIA: ICD-10-CM

## 2019-04-04 DIAGNOSIS — Z00.00 BLOOD TESTS FOR ROUTINE GENERAL PHYSICAL EXAMINATION: ICD-10-CM

## 2019-04-04 DIAGNOSIS — Z12.5 SCREENING PSA (PROSTATE SPECIFIC ANTIGEN): ICD-10-CM

## 2019-04-04 DIAGNOSIS — I10 ESSENTIAL HYPERTENSION: ICD-10-CM

## 2019-04-04 DIAGNOSIS — E78.00 PURE HYPERCHOLESTEROLEMIA: ICD-10-CM

## 2019-04-04 PROCEDURE — 80061 LIPID PANEL: CPT

## 2019-04-04 PROCEDURE — 83036 HEMOGLOBIN GLYCOSYLATED A1C: CPT

## 2019-04-04 PROCEDURE — 84443 ASSAY THYROID STIM HORMONE: CPT

## 2019-04-04 PROCEDURE — 36415 COLL VENOUS BLD VENIPUNCTURE: CPT

## 2019-04-04 PROCEDURE — 80053 COMPREHEN METABOLIC PANEL: CPT

## 2019-04-04 PROCEDURE — 85025 COMPLETE CBC W/AUTO DIFF WBC: CPT

## 2019-05-23 RX ORDER — LISINOPRIL 5 MG/1
TABLET ORAL
Qty: 90 TABLET | Refills: 0 | Status: SHIPPED | OUTPATIENT
Start: 2019-05-23 | End: 2019-06-04

## 2019-06-04 ENCOUNTER — OFFICE VISIT (OUTPATIENT)
Dept: INTERNAL MEDICINE CLINIC | Facility: CLINIC | Age: 54
End: 2019-06-04
Payer: COMMERCIAL

## 2019-06-04 VITALS
BODY MASS INDEX: 27.85 KG/M2 | HEIGHT: 68.9 IN | HEART RATE: 88 BPM | TEMPERATURE: 99 F | RESPIRATION RATE: 16 BRPM | WEIGHT: 188 LBS | SYSTOLIC BLOOD PRESSURE: 112 MMHG | DIASTOLIC BLOOD PRESSURE: 74 MMHG

## 2019-06-04 DIAGNOSIS — Z00.00 PE (PHYSICAL EXAM), ANNUAL: Primary | ICD-10-CM

## 2019-06-04 DIAGNOSIS — S16.1XXA STRAIN OF NECK MUSCLE, INITIAL ENCOUNTER: ICD-10-CM

## 2019-06-04 DIAGNOSIS — I10 ESSENTIAL HYPERTENSION: ICD-10-CM

## 2019-06-04 DIAGNOSIS — E78.00 PURE HYPERCHOLESTEROLEMIA: ICD-10-CM

## 2019-06-04 PROCEDURE — 90732 PPSV23 VACC 2 YRS+ SUBQ/IM: CPT | Performed by: INTERNAL MEDICINE

## 2019-06-04 PROCEDURE — 99396 PREV VISIT EST AGE 40-64: CPT | Performed by: INTERNAL MEDICINE

## 2019-06-04 PROCEDURE — 90471 IMMUNIZATION ADMIN: CPT | Performed by: INTERNAL MEDICINE

## 2019-06-04 RX ORDER — ROSUVASTATIN CALCIUM 10 MG/1
TABLET, COATED ORAL
Qty: 90 TABLET | Refills: 3 | Status: SHIPPED | OUTPATIENT
Start: 2019-06-04 | End: 2020-10-23

## 2019-06-04 RX ORDER — LISINOPRIL 5 MG/1
5 TABLET ORAL
Qty: 90 TABLET | Refills: 3 | Status: SHIPPED | OUTPATIENT
Start: 2019-06-04 | End: 2020-07-24
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
## 2019-10-31 ENCOUNTER — NURSE ONLY (OUTPATIENT)
Dept: INTERNAL MEDICINE CLINIC | Facility: CLINIC | Age: 54
End: 2019-10-31
Payer: COMMERCIAL

## 2019-10-31 DIAGNOSIS — Z23 NEED FOR VACCINATION: ICD-10-CM

## 2019-10-31 PROCEDURE — 90686 IIV4 VACC NO PRSV 0.5 ML IM: CPT | Performed by: PHYSICIAN ASSISTANT

## 2019-10-31 PROCEDURE — 90471 IMMUNIZATION ADMIN: CPT | Performed by: PHYSICIAN ASSISTANT

## 2020-07-01 ENCOUNTER — TELEPHONE (OUTPATIENT)
Dept: INTERNAL MEDICINE CLINIC | Facility: CLINIC | Age: 55
End: 2020-07-01

## 2020-07-01 NOTE — TELEPHONE ENCOUNTER
Spoke with wife Renee Hernandez discussed medications and htn, hyperlipidemia dates. Renee Hernandez verbalized understanding and agreeable to POC.

## 2020-07-01 NOTE — TELEPHONE ENCOUNTER
Per insurance request.     When was Pt diagnosed for high cholestrol and high BP and the need for the medication he is taking     Pls call wife Maye Jama [phone]

## 2020-07-24 RX ORDER — LISINOPRIL 5 MG/1
TABLET ORAL
Qty: 90 TABLET | Refills: 0 | Status: SHIPPED | OUTPATIENT
Start: 2020-07-24 | End: 2021-01-11

## 2020-07-24 NOTE — TELEPHONE ENCOUNTER
Last Ov:6/4/19  Upcoming appt:none  Last labs:4/4/19 cbc, cmp, lipid, A1C, tsh, psa  Last Rx:6/4/19 lisinopril 5mg    Per Protocol sent for review    Due for physical

## 2020-08-07 NOTE — TELEPHONE ENCOUNTER
Pt called and scheduled CPE   Future Appointments   Date Time Provider Haley Rubia   8/19/2020  1:00 PM Jasvir Veras MD [facility]

## 2020-08-17 ENCOUNTER — TELEPHONE (OUTPATIENT)
Dept: INTERNAL MEDICINE CLINIC | Facility: CLINIC | Age: 55
End: 2020-08-17

## 2020-08-17 ENCOUNTER — APPOINTMENT (OUTPATIENT)
Dept: LAB | Age: 55
End: 2020-08-17
Attending: INTERNAL MEDICINE
Payer: COMMERCIAL

## 2020-08-17 DIAGNOSIS — Z13.220 SCREENING CHOLESTEROL LEVEL: ICD-10-CM

## 2020-08-17 DIAGNOSIS — I10 ESSENTIAL HYPERTENSION: ICD-10-CM

## 2020-08-17 DIAGNOSIS — Z12.5 SCREENING PSA (PROSTATE SPECIFIC ANTIGEN): ICD-10-CM

## 2020-08-17 DIAGNOSIS — Z13.29 ENCOUNTER FOR SCREENING FOR ENDOCRINE DISORDER: ICD-10-CM

## 2020-08-17 DIAGNOSIS — Z13.228 ENCOUNTER FOR SCREENING FOR OTHER METABOLIC DISORDERS: ICD-10-CM

## 2020-08-17 DIAGNOSIS — Z00.00 PE (PHYSICAL EXAM), ANNUAL: ICD-10-CM

## 2020-08-17 DIAGNOSIS — E78.00 PURE HYPERCHOLESTEROLEMIA: Primary | ICD-10-CM

## 2020-08-17 DIAGNOSIS — Z13.0 ENCOUNTER FOR SCREENING FOR DISEASES OF THE BLOOD AND BLOOD-FORMING ORGANS AND CERTAIN DISORDERS INVOLVING THE IMMUNE MECHANISM: ICD-10-CM

## 2020-08-17 LAB
ALBUMIN SERPL-MCNC: 4.1 G/DL (ref 3.4–5)
ALBUMIN/GLOB SERPL: 1.1 {RATIO} (ref 1–2)
ALP LIVER SERPL-CCNC: 78 U/L (ref 45–117)
ALT SERPL-CCNC: 25 U/L (ref 16–61)
ANION GAP SERPL CALC-SCNC: 3 MMOL/L (ref 0–18)
AST SERPL-CCNC: 16 U/L (ref 15–37)
BASOPHILS # BLD AUTO: 0.06 X10(3) UL (ref 0–0.2)
BASOPHILS NFR BLD AUTO: 1 %
BILIRUB SERPL-MCNC: 0.5 MG/DL (ref 0.1–2)
BUN BLD-MCNC: 12 MG/DL (ref 7–18)
BUN/CREAT SERPL: 13 (ref 10–20)
CALCIUM BLD-MCNC: 8.6 MG/DL (ref 8.5–10.1)
CHLORIDE SERPL-SCNC: 112 MMOL/L (ref 98–112)
CHOLEST SMN-MCNC: 205 MG/DL (ref ?–200)
CO2 SERPL-SCNC: 27 MMOL/L (ref 21–32)
COMPLEXED PSA SERPL-MCNC: 0.78 NG/ML (ref ?–4)
CREAT BLD-MCNC: 0.92 MG/DL (ref 0.7–1.3)
DEPRECATED RDW RBC AUTO: 44.6 FL (ref 35.1–46.3)
EOSINOPHIL # BLD AUTO: 0.28 X10(3) UL (ref 0–0.7)
EOSINOPHIL NFR BLD AUTO: 4.8 %
ERYTHROCYTE [DISTWIDTH] IN BLOOD BY AUTOMATED COUNT: 13.4 % (ref 11–15)
GLOBULIN PLAS-MCNC: 3.7 G/DL (ref 2.8–4.4)
GLUCOSE BLD-MCNC: 100 MG/DL (ref 70–99)
HCT VFR BLD AUTO: 47.5 % (ref 39–53)
HDLC SERPL-MCNC: 64 MG/DL (ref 40–59)
HGB BLD-MCNC: 15.9 G/DL (ref 13–17.5)
IMM GRANULOCYTES # BLD AUTO: 0.03 X10(3) UL (ref 0–1)
IMM GRANULOCYTES NFR BLD: 0.5 %
LDLC SERPL CALC-MCNC: 106 MG/DL (ref ?–100)
LYMPHOCYTES # BLD AUTO: 1.88 X10(3) UL (ref 1–4)
LYMPHOCYTES NFR BLD AUTO: 32.5 %
M PROTEIN MFR SERPL ELPH: 7.8 G/DL (ref 6.4–8.2)
MCH RBC QN AUTO: 30.3 PG (ref 26–34)
MCHC RBC AUTO-ENTMCNC: 33.5 G/DL (ref 31–37)
MCV RBC AUTO: 90.5 FL (ref 80–100)
MONOCYTES # BLD AUTO: 0.44 X10(3) UL (ref 0.1–1)
MONOCYTES NFR BLD AUTO: 7.6 %
NEUTROPHILS # BLD AUTO: 3.09 X10 (3) UL (ref 1.5–7.7)
NEUTROPHILS # BLD AUTO: 3.09 X10(3) UL (ref 1.5–7.7)
NEUTROPHILS NFR BLD AUTO: 53.6 %
NONHDLC SERPL-MCNC: 141 MG/DL (ref ?–130)
OSMOLALITY SERPL CALC.SUM OF ELEC: 294 MOSM/KG (ref 275–295)
PATIENT FASTING Y/N/NP: YES
PATIENT FASTING Y/N/NP: YES
PLATELET # BLD AUTO: 275 10(3)UL (ref 150–450)
POTASSIUM SERPL-SCNC: 4 MMOL/L (ref 3.5–5.1)
RBC # BLD AUTO: 5.25 X10(6)UL (ref 4.3–5.7)
SODIUM SERPL-SCNC: 142 MMOL/L (ref 136–145)
TRIGL SERPL-MCNC: 176 MG/DL (ref 30–149)
TSI SER-ACNC: 1.34 MIU/ML (ref 0.36–3.74)
VLDLC SERPL CALC-MCNC: 35 MG/DL (ref 0–30)
WBC # BLD AUTO: 5.8 X10(3) UL (ref 4–11)

## 2020-08-17 PROCEDURE — 80061 LIPID PANEL: CPT | Performed by: INTERNAL MEDICINE

## 2020-08-17 PROCEDURE — 36415 COLL VENOUS BLD VENIPUNCTURE: CPT | Performed by: INTERNAL MEDICINE

## 2020-08-17 PROCEDURE — 80053 COMPREHEN METABOLIC PANEL: CPT | Performed by: INTERNAL MEDICINE

## 2020-08-17 PROCEDURE — 84443 ASSAY THYROID STIM HORMONE: CPT | Performed by: INTERNAL MEDICINE

## 2020-08-17 PROCEDURE — 85025 COMPLETE CBC W/AUTO DIFF WBC: CPT | Performed by: INTERNAL MEDICINE

## 2020-08-17 NOTE — TELEPHONE ENCOUNTER
Pt is requesting this is lab orders go through [address] for just this time. High priority - pt is stating he wants to get his labs done today. Please advise. Orders to [address]. Pt aware to get labs done no sooner than 2 weeks prior to the appt.   Pt awar

## 2020-08-19 ENCOUNTER — OFFICE VISIT (OUTPATIENT)
Dept: INTERNAL MEDICINE CLINIC | Facility: CLINIC | Age: 55
End: 2020-08-19
Payer: COMMERCIAL

## 2020-08-19 VITALS
HEIGHT: 69.88 IN | RESPIRATION RATE: 16 BRPM | TEMPERATURE: 98 F | DIASTOLIC BLOOD PRESSURE: 88 MMHG | WEIGHT: 181 LBS | HEART RATE: 76 BPM | BODY MASS INDEX: 26.21 KG/M2 | SYSTOLIC BLOOD PRESSURE: 134 MMHG

## 2020-08-19 DIAGNOSIS — Z00.00 PE (PHYSICAL EXAM), ANNUAL: Primary | ICD-10-CM

## 2020-08-19 DIAGNOSIS — R07.89 STERNAL PAIN: ICD-10-CM

## 2020-08-19 DIAGNOSIS — M89.8X8 MASS OF STERNUM: ICD-10-CM

## 2020-08-19 DIAGNOSIS — E78.00 PURE HYPERCHOLESTEROLEMIA: ICD-10-CM

## 2020-08-19 DIAGNOSIS — I10 ESSENTIAL HYPERTENSION: ICD-10-CM

## 2020-08-19 PROCEDURE — 3075F SYST BP GE 130 - 139MM HG: CPT | Performed by: INTERNAL MEDICINE

## 2020-08-19 PROCEDURE — 99396 PREV VISIT EST AGE 40-64: CPT | Performed by: INTERNAL MEDICINE

## 2020-08-19 PROCEDURE — 3079F DIAST BP 80-89 MM HG: CPT | Performed by: INTERNAL MEDICINE

## 2020-08-19 PROCEDURE — 3008F BODY MASS INDEX DOCD: CPT | Performed by: INTERNAL MEDICINE
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
## 2020-08-24 ENCOUNTER — TELEPHONE (OUTPATIENT)
Dept: INTERNAL MEDICINE CLINIC | Facility: CLINIC | Age: 55
End: 2020-08-24

## 2020-08-24 NOTE — TELEPHONE ENCOUNTER
CT Chest ordered 8/19/20. Please call wife to let her know she will need to call [facility] Referrals to see if authorization completed but typically it will take 5-7 days. Thank you.

## 2020-08-24 NOTE — TELEPHONE ENCOUNTER
Pt wife called to see if we have gotten his CT Chest authorized yet?  Please call pt when done so he can schedule

## 2020-10-06 ENCOUNTER — HOSPITAL ENCOUNTER (OUTPATIENT)
Dept: CT IMAGING | Age: 55
Discharge: HOME | End: 2020-10-06
Attending: INTERNAL MEDICINE
Payer: COMMERCIAL

## 2020-10-06 DIAGNOSIS — M89.8X8 MASS OF STERNUM: ICD-10-CM

## 2020-10-06 DIAGNOSIS — R07.89 STERNAL PAIN: ICD-10-CM

## 2020-10-06 PROCEDURE — 71250 CT THORAX DX C-: CPT | Performed by: INTERNAL MEDICINE

## 2020-10-20 ENCOUNTER — TELEPHONE (OUTPATIENT)
Dept: INTERNAL MEDICINE CLINIC | Facility: CLINIC | Age: 55
End: 2020-10-20

## 2020-10-20 DIAGNOSIS — Z20.822 EXPOSURE TO COVID-19 VIRUS: Primary | ICD-10-CM

## 2020-10-21 ENCOUNTER — APPOINTMENT (OUTPATIENT)
Dept: LAB | Age: 55
End: 2020-10-21
Attending: INTERNAL MEDICINE
Payer: COMMERCIAL

## 2020-10-21 DIAGNOSIS — Z20.822 EXPOSURE TO COVID-19 VIRUS: ICD-10-CM

## 2020-10-23 RX ORDER — ROSUVASTATIN CALCIUM 10 MG/1
TABLET, COATED ORAL
Qty: 90 TABLET | Refills: 0 | Status: SHIPPED | OUTPATIENT
Start: 2020-10-23 | End: 2021-01-11

## 2021-01-11 RX ORDER — ROSUVASTATIN CALCIUM 10 MG/1
TABLET, COATED ORAL
Qty: 90 TABLET | Refills: 0 | Status: SHIPPED | OUTPATIENT
Start: 2021-01-11 | End: 2021-03-16

## 2021-01-11 RX ORDER — LISINOPRIL 5 MG/1
TABLET ORAL
Qty: 90 TABLET | Refills: 0 | Status: SHIPPED | OUTPATIENT
Start: 2021-01-11 | End: 2021-02-04

## 2021-02-02 ENCOUNTER — PATIENT MESSAGE (OUTPATIENT)
Dept: INTERNAL MEDICINE CLINIC | Facility: CLINIC | Age: 56
End: 2021-02-02

## 2021-02-03 NOTE — TELEPHONE ENCOUNTER
From: Tarik Ward  To: Carmen Chester MD  Sent: 2/2/2021 7:57 PM CST  Subject: Other    Hello Doctor, I hope you’re well and all is good.  For last few months I have had discoloration in my right hand , middle 3 fingers, on occasion my pinky finger get

## 2021-02-03 NOTE — TELEPHONE ENCOUNTER
I called patient to assist in scheduling an appointment as advised by Dr. Ronak Eason and MyChart message as well.

## 2021-02-03 NOTE — TELEPHONE ENCOUNTER
Future Appointments   Date Time Provider Haley Barkley   2/4/2021 12:40 PM ELLA Wood [facility]

## 2021-02-04 PROBLEM — F41.9 ANXIETY: Status: ACTIVE | Noted: 2021-02-04

## 2021-02-04 PROBLEM — I73.00 RAYNAUD'S DISEASE WITHOUT GANGRENE: Status: ACTIVE | Noted: 2021-02-04

## 2021-02-04 NOTE — PATIENT INSTRUCTIONS
Raynaud Disease  Your healthcare provider has told you that you have Raynaud disease. It is also called Raynaud phenomenon or Raynaud syndrome. There is no cure for Raynaud disease, but you can manage it to help prevent attacks.     What are the symptoms · Having a family member with Raynaud disease increases your risk. · Underlying rheumatoid conditions may increase your risk.   What are possible triggers?   Triggers for Raynaud disease include:   · Cold  · Stress  · Caffeine  · Smoking  · Repetitive move · Nerve surgery. This is used for severe cases that don’t respond to other treatments. Surgery removes the nerves that surround the blood vessels in the hands and feet. Without nerve stimulation, the blood vessels stay more relaxed.  They are less likely to
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
## 2021-03-16 RX ORDER — ROSUVASTATIN CALCIUM 10 MG/1
TABLET, COATED ORAL
Qty: 90 TABLET | Refills: 0 | Status: SHIPPED | OUTPATIENT
Start: 2021-03-16 | End: 2021-06-22

## 2021-03-20 ENCOUNTER — PATIENT MESSAGE (OUTPATIENT)
Dept: INTERNAL MEDICINE CLINIC | Facility: CLINIC | Age: 56
End: 2021-03-20

## 2021-03-22 NOTE — TELEPHONE ENCOUNTER
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  I a

## 2021-04-12 ENCOUNTER — LAB ENCOUNTER (OUTPATIENT)
Dept: LAB | Age: 56
End: 2021-04-12
Attending: NURSE PRACTITIONER
Payer: COMMERCIAL

## 2021-04-12 PROBLEM — I73.00 RAYNAUD'S DISEASE WITHOUT GANGRENE: Status: RESOLVED | Noted: 2021-02-04 | Resolved: 2021-04-12

## 2021-04-12 PROCEDURE — 36415 COLL VENOUS BLD VENIPUNCTURE: CPT | Performed by: NURSE PRACTITIONER

## 2021-04-12 PROCEDURE — 80053 COMPREHEN METABOLIC PANEL: CPT | Performed by: NURSE PRACTITIONER

## 2021-04-12 PROCEDURE — 85025 COMPLETE CBC W/AUTO DIFF WBC: CPT | Performed by: NURSE PRACTITIONER

## 2021-04-12 PROCEDURE — 84443 ASSAY THYROID STIM HORMONE: CPT | Performed by: NURSE PRACTITIONER

## 2021-04-12 NOTE — PROGRESS NOTES
Rashid Chavira is a 61year old male. Patient presents with: Follow - Up: AJ rm 10 f/u discoloration on fingers      HPI:   Here for follow up   Last ov in Feb with c/o finger tip issues. Reported color change.   His bp was elevated as well so changed t tablet 3   • ALPRAZolam (XANAX) 0.25 MG Oral Tab Take 1 tablet (0.25 mg total) by mouth nightly as needed. 20 tablet 0   • Sertraline HCl 50 MG Oral Tab Take 1 tablet (50 mg total) by mouth daily.  90 tablet 0   • ROSUVASTATIN CALCIUM 10 MG Oral Tab TAKE 1 constipation  MUSCULOSKELETAL:  No arthralgias or myalgias  NEURO: denies headaches,  Fine tremor.        EXAM:   /90   Pulse 82   Temp 98.5 °F (36.9 °C) (Oral)   Ht 5' 9\" (1.753 m)   Wt 176 lb (79.8 kg)   BMI 25.99 kg/m²   GENERAL: well developed, w

## 2021-04-13 ENCOUNTER — TELEPHONE (OUTPATIENT)
Dept: INTERNAL MEDICINE CLINIC | Facility: CLINIC | Age: 56
End: 2021-04-13

## 2021-04-13 DIAGNOSIS — Z20.822 CLOSE EXPOSURE TO COVID-19 VIRUS: Primary | ICD-10-CM
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
## 2021-04-13 NOTE — TELEPHONE ENCOUNTER
Patient notified SD ordered COVID testing, notified to call [facility] to schedule,, info given. Pt verbalizes understanding.
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
## 2021-04-14 ENCOUNTER — LAB ENCOUNTER (OUTPATIENT)
Dept: LAB | Age: 56
End: 2021-04-14
Attending: NURSE PRACTITIONER
Payer: COMMERCIAL

## 2021-04-14 DIAGNOSIS — Z20.822 CLOSE EXPOSURE TO COVID-19 VIRUS: ICD-10-CM

## 2021-04-19 ENCOUNTER — OFFICE VISIT (OUTPATIENT)
Dept: NEUROLOGY | Facility: CLINIC | Age: 56
End: 2021-04-19
Payer: COMMERCIAL

## 2021-04-19 VITALS
WEIGHT: 180 LBS | RESPIRATION RATE: 16 BRPM | HEART RATE: 76 BPM | DIASTOLIC BLOOD PRESSURE: 70 MMHG | BODY MASS INDEX: 27 KG/M2 | SYSTOLIC BLOOD PRESSURE: 124 MMHG

## 2021-04-19 DIAGNOSIS — G25.0 ESSENTIAL TREMOR: Primary | ICD-10-CM

## 2021-04-19 DIAGNOSIS — F41.9 ANXIETY: ICD-10-CM

## 2021-04-19 PROCEDURE — 3074F SYST BP LT 130 MM HG: CPT | Performed by: OTHER

## 2021-04-19 PROCEDURE — 3078F DIAST BP <80 MM HG: CPT | Performed by: OTHER

## 2021-04-19 PROCEDURE — 99204 OFFICE O/P NEW MOD 45 MIN: CPT | Performed by: OTHER

## 2021-04-19 RX ORDER — PRIMIDONE 50 MG/1
50 TABLET ORAL NIGHTLY
Qty: 30 TABLET | Refills: 0 | Status: SHIPPED | OUTPATIENT
Start: 2021-04-19 | End: 2021-05-12

## 2021-04-19 NOTE — H&P
Neurology H&P    [place] Patient Status:  No patient class for patient encounter    2/3/1965 MRN [id number]   Location [address] [facility], [address] [place] [address] Drive, [address] [facility] Attending No att. providers found   Hosp Day # 0 PCP Grecia Mejia MD     [facility] daily. 90 tablet 3   • aspirin 81 MG Oral Tab Take 1 tablet (81 mg total) by mouth daily.   0       Problem List:  Patient Active Problem List:     Hyperlipidemia     Lumbar stenosis     Annular tear of lumbar disc     Lumbar facet arthropathy     Lumbar ra N/A 4/28/2015    Procedure: LUMBAR EPIDURAL;  Surgeon: Lennice Felty, MD;  Location: [address] [place] [address]   • SPINE SURGERY PROCEDURE UNLISTED      l4-5 fusion 6/2015   • TONSILLECTOMY Bilateral 7/16/2015    Procedure: TONSILLECTOMY;  Surgeon: Pat Bailey EXAMINATION:  UE: intact to light touch, pinprick intact  LE: intact to light touch, pinprick intact    COORDINATION:  No dysmetria, or intention tremors     REFLEXES: 2+ at biceps, 2+ brachioradialis, 2+ at patella, 2+ at the ankles     GAIT: normal stanc

## 2021-04-26 NOTE — PROGRESS NOTES
Fouzia Holman is a 61year old male. Patient presents with: Follow - Up: AJ rm 10 f/u discoloration fingers      HPI:   Here for follow up    We have been following for increased anxiety as well as HTN and dicoloration of skin on fingers. Tremor. mg total) by mouth daily.   0      Past Medical History:   Diagnosis Date   • Acute sinusitis, unspecified    • Anesthesia complication     episodes of awakening   • Back problem    • Chest pain, unspecified    • Hernia of unspecified site of abdominal cavi distress, lungs clear to auscultation  CARDIO: RRR without murmur  EXTREMITIES: no edema, normal strength and tone  PSYCH: alert and oriented x 3    ASSESSMENT AND PLAN:     Anxiety  (primary encounter diagnosis)   Sertraline 50mg.   Has information for cou

## 2021-04-27 NOTE — TELEPHONE ENCOUNTER
gonzales Richardson never received refill for   lisinopril 10 MG Oral Tab 90 tablet 3 4/12/2021    Sig:   Take 1 tablet (10 mg total) by mouth daily. Route:   Oral     Class:   Historical       Please resend to      [facility]/PHARMACY #[id number] [place], [place] - [address]

## 2021-04-30 ENCOUNTER — TELEPHONE (OUTPATIENT)
Dept: INTERNAL MEDICINE CLINIC | Facility: CLINIC | Age: 56
End: 2021-04-30

## 2021-04-30 RX ORDER — SERTRALINE HYDROCHLORIDE 25 MG/1
TABLET, FILM COATED ORAL
Qty: 90 TABLET | Refills: 0 | OUTPATIENT
Start: 2021-04-30

## 2021-04-30 NOTE — TELEPHONE ENCOUNTER
Lisa Thomas, APRN  P [facility] Clinical Staff  Please provide him with [facility] as well.    Thanks.

## 2021-05-04 ENCOUNTER — PATIENT MESSAGE (OUTPATIENT)
Dept: INTERNAL MEDICINE CLINIC | Facility: CLINIC | Age: 56
End: 2021-05-04

## 2021-05-04 RX ORDER — LISINOPRIL 10 MG/1
10 TABLET ORAL DAILY
Qty: 90 TABLET | Refills: 0 | OUTPATIENT
Start: 2021-05-04

## 2021-05-04 NOTE — TELEPHONE ENCOUNTER
From: Usha Peng  To: ELLA Mae  Sent: 5/4/2021 12:32 PM CDT  Subject: Prescription Question    Hello, Hope your well and all is good.  I have been receiving messages for denial of refill, I was just giving a new prescription for   Lisinopril

## 2021-05-12 DIAGNOSIS — G25.0 ESSENTIAL TREMOR: Primary | ICD-10-CM

## 2021-05-12 RX ORDER — PRIMIDONE 50 MG/1
TABLET ORAL
Qty: 30 TABLET | Refills: 1 | Status: SHIPPED | OUTPATIENT
Start: 2021-05-12 | End: 2021-06-21

## 2021-05-12 NOTE — TELEPHONE ENCOUNTER
Medication: PRIMIDONE 50 MG     Date of last refill: 4/19/21 (#30/0)  [place]     Last office visit: 4/19/2021  Due back to clinic per last office note:  2 months  Date next office visit scheduled:    Future Appointments

## 2021-05-19 RX ORDER — LISINOPRIL 10 MG/1
10 TABLET ORAL DAILY
Qty: 90 TABLET | Refills: 3 | OUTPATIENT
Start: 2021-05-19

## 2021-05-25 RX ORDER — LISINOPRIL 10 MG/1
10 TABLET ORAL DAILY
Qty: 90 TABLET | Refills: 1 | Status: SHIPPED | OUTPATIENT
Start: 2021-05-25 | End: 2021-11-24

## 2021-05-25 NOTE — TELEPHONE ENCOUNTER
Pt needs refill sent to pharmacy. RX looks like it was marked as Historical and not actually sent on 04/12/21    Medication Quantity Refills Start End   lisinopril 10 MG Oral Tab 90 tablet 3 4/12/2021    Sig:   Take 1 tablet (10 mg total) by mouth daily.

## 2021-06-21 ENCOUNTER — OFFICE VISIT (OUTPATIENT)
Dept: NEUROLOGY | Facility: CLINIC | Age: 56
End: 2021-06-21
Payer: COMMERCIAL

## 2021-06-21 VITALS
SYSTOLIC BLOOD PRESSURE: 130 MMHG | BODY MASS INDEX: 26 KG/M2 | RESPIRATION RATE: 16 BRPM | DIASTOLIC BLOOD PRESSURE: 76 MMHG | WEIGHT: 176 LBS

## 2021-06-21 DIAGNOSIS — G25.0 ESSENTIAL TREMOR: Primary | ICD-10-CM

## 2021-06-21 PROCEDURE — 99213 OFFICE O/P EST LOW 20 MIN: CPT | Performed by: OTHER

## 2021-06-21 PROCEDURE — 3078F DIAST BP <80 MM HG: CPT | Performed by: OTHER

## 2021-06-21 PROCEDURE — 3075F SYST BP GE 130 - 139MM HG: CPT | Performed by: OTHER

## 2021-06-21 RX ORDER — PRIMIDONE 50 MG/1
50 TABLET ORAL NIGHTLY
Qty: 90 TABLET | Refills: 1 | Status: SHIPPED | OUTPATIENT
Start: 2021-06-21 | End: 2021-11-30

## 2021-06-21 NOTE — PROGRESS NOTES
Neurology H&P    [place] Patient Status:  No patient class for patient encounter    2/3/1965 MRN [id number]   Location [facility], [address], [address] [facility] Attending No att. providers found   Hosp Day # 0 PCP MD Danii Krishnamurthy 1 tablet (50 mg total) by mouth daily. 90 tablet 0   • ROSUVASTATIN CALCIUM 10 MG Oral Tab TAKE 1 TABLET BY MOUTH EVERY DAY IN THE EVENING 90 tablet 0   • aspirin 81 MG Oral Tab Take 1 tablet (81 mg total) by mouth daily.   0       Problem List:  Patient Ac Latoya Marlow MD;  Location: 1 [facility]  PAIN MANAGEMENT   • INJECTION, W/WO CONTRAST, DX/THERAPEUTIC SUBSTANCE, EPIDURAL/SUBARACHNOID; LUMBAR/SACRAL N/A 4/28/2015    Procedure: LUMBAR EPIDURAL;  Surgeon: Danita Arora MD;  Location: 1 [facility]  atrophy, strong shoulder shrug.     MOTOR EXAMINATION: normal tone, no fasciculations, normal strength throughout in UEs and LEs      SENSORY EXAMINATION:  UE: intact to light touch, pinprick intact  LE: intact to light touch, pinprick intact    COORDINATIO

## 2021-06-22 RX ORDER — ROSUVASTATIN CALCIUM 10 MG/1
TABLET, COATED ORAL
Qty: 90 TABLET | Refills: 0 | Status: SHIPPED | OUTPATIENT
Start: 2021-06-22 | End: 2021-09-17

## 2021-06-22 NOTE — TELEPHONE ENCOUNTER
Last visit- 04/26/2021    Last refill-  03/16/2021 rosuvastatin calcium 10mg [id number] 0R    Last labs-  04/12/2021 tsh, cmp, cbc  Future Appointments   Date Time Provider Haley Barkley   12/10/2021  1:00 PM DO LISHA Winn

## 2021-09-01 ENCOUNTER — TELEPHONE (OUTPATIENT)
Dept: INTERNAL MEDICINE CLINIC | Facility: CLINIC | Age: 56
End: 2021-09-01

## 2021-09-01 ENCOUNTER — TELEPHONE (OUTPATIENT)
Dept: NEUROLOGY | Facility: CLINIC | Age: 56
End: 2021-09-01

## 2021-09-01 DIAGNOSIS — Z23 NEED FOR SHINGLES VACCINE: Primary | ICD-10-CM

## 2021-09-01 NOTE — TELEPHONE ENCOUNTER
1st shingles shot. unknown if pt had chicken pox as a child     Future Appointments   Date Time Provider Haley Barkley   9/7/2021 10:00 AM [facility] NURSE [facility]

## 2021-09-07 ENCOUNTER — NURSE ONLY (OUTPATIENT)
Dept: INTERNAL MEDICINE CLINIC | Facility: CLINIC | Age: 56
End: 2021-09-07
Payer: COMMERCIAL

## 2021-09-07 PROCEDURE — 90750 HZV VACC RECOMBINANT IM: CPT | Performed by: NURSE PRACTITIONER

## 2021-09-07 PROCEDURE — 90471 IMMUNIZATION ADMIN: CPT | Performed by: NURSE PRACTITIONER

## 2021-09-16 NOTE — TELEPHONE ENCOUNTER
Been Following SD/AS  Last OV 4/26/21  Last CPE 8/19/20  Last Labs CBC, CMP, TSH w Ref 4/12/21    Last Rx fill Rosuvastatin 10mg #90 0R 6/22/21    Future Appointments   Date Time Provider Haley Barkley   12/10/2021  1:00 PM Kavitha Chester DO [facility] Pt sitting up in bed watching TV, in no visible distress. Pt denies any pain at this time. Monitor intact, call light at side.

## 2021-09-17 RX ORDER — ROSUVASTATIN CALCIUM 10 MG/1
TABLET, COATED ORAL
Qty: 90 TABLET | Refills: 0 | Status: SHIPPED | OUTPATIENT
Start: 2021-09-17 | End: 2021-12-13

## 2021-10-26 ENCOUNTER — IMMUNIZATION (OUTPATIENT)
Dept: INTERNAL MEDICINE CLINIC | Facility: CLINIC | Age: 56
End: 2021-10-26
Payer: COMMERCIAL

## 2021-10-26 DIAGNOSIS — Z23 NEED FOR VACCINATION: Primary | ICD-10-CM

## 2021-10-26 PROCEDURE — 90471 IMMUNIZATION ADMIN: CPT | Performed by: NURSE PRACTITIONER

## 2021-10-26 PROCEDURE — 90686 IIV4 VACC NO PRSV 0.5 ML IM: CPT | Performed by: NURSE PRACTITIONER

## 2021-11-12 ENCOUNTER — NURSE ONLY (OUTPATIENT)
Dept: INTERNAL MEDICINE CLINIC | Facility: CLINIC | Age: 56
End: 2021-11-12
Payer: COMMERCIAL

## 2021-11-12 ENCOUNTER — TELEPHONE (OUTPATIENT)
Dept: INTERNAL MEDICINE CLINIC | Facility: CLINIC | Age: 56
End: 2021-11-12

## 2021-11-12 DIAGNOSIS — Z23 NEED FOR SHINGLES VACCINE: Primary | ICD-10-CM

## 2021-11-12 PROCEDURE — 90471 IMMUNIZATION ADMIN: CPT | Performed by: NURSE PRACTITIONER

## 2021-11-12 PROCEDURE — 90750 HZV VACC RECOMBINANT IM: CPT | Performed by: NURSE PRACTITIONER

## 2021-11-12 NOTE — TELEPHONE ENCOUNTER
Future Appointments   Date Time Provider Haley Rubia   11/12/2021  2:30 PM [facility] NURSE [facility]     Please place orders for Shingles Vaccine.

## 2021-11-12 NOTE — TELEPHONE ENCOUNTER
LOV 4/26/21 with SD. Pended #2 Shingrix. OK to order?      Zoster Vaccine Recombinant Adjuvanted (Shingrix)9/7/2021

## 2021-11-24 RX ORDER — LISINOPRIL 10 MG/1
TABLET ORAL
Qty: 90 TABLET | Refills: 1 | Status: SHIPPED | OUTPATIENT
Start: 2021-11-24 | End: 2022-01-21

## 2021-11-24 NOTE — TELEPHONE ENCOUNTER
Last OV: 4/26/21 f/u acute  Last PE: 8/19/20    Future Appointments   Date Time Provider Haley Barkley   12/8/2021  2:00 PM DO SUSHANT Mclean [id number]        Latest labs: 4/12/21 TSH, CMP and CBC    Latest RX: Lisinopril- 90 tabs

## 2021-11-30 DIAGNOSIS — G25.0 ESSENTIAL TREMOR: ICD-10-CM

## 2021-11-30 NOTE — TELEPHONE ENCOUNTER
Medication: PRIMIDONE 50 MG      Date of last refill: 6/21/21 (#90/1)  [place]      Last office visit: 6/21/2021  Due back to clinic per last office note:  6 months  Date next office visit scheduled:    Future Appointmen

## 2021-12-01 RX ORDER — PRIMIDONE 50 MG/1
TABLET ORAL
Qty: 90 TABLET | Refills: 0 | Status: SHIPPED | OUTPATIENT
Start: 2021-12-01

## 2021-12-10 ENCOUNTER — OFFICE VISIT (OUTPATIENT)
Dept: INTERNAL MEDICINE CLINIC | Facility: CLINIC | Age: 56
End: 2021-12-10
Payer: COMMERCIAL

## 2021-12-10 VITALS
BODY MASS INDEX: 26.36 KG/M2 | SYSTOLIC BLOOD PRESSURE: 128 MMHG | TEMPERATURE: 98 F | HEART RATE: 76 BPM | DIASTOLIC BLOOD PRESSURE: 74 MMHG | WEIGHT: 178 LBS | HEIGHT: 69 IN

## 2021-12-10 DIAGNOSIS — Z13.29 SCREENING FOR THYROID DISORDER: ICD-10-CM

## 2021-12-10 DIAGNOSIS — E78.00 PURE HYPERCHOLESTEROLEMIA: ICD-10-CM

## 2021-12-10 DIAGNOSIS — F41.9 ANXIETY: ICD-10-CM

## 2021-12-10 DIAGNOSIS — Z13.0 SCREENING FOR BLOOD DISEASE: ICD-10-CM

## 2021-12-10 DIAGNOSIS — Z12.5 SCREENING FOR PROSTATE CANCER: ICD-10-CM

## 2021-12-10 DIAGNOSIS — M77.11 RIGHT LATERAL EPICONDYLITIS: Primary | ICD-10-CM

## 2021-12-10 DIAGNOSIS — M77.8 LEFT SHOULDER TENDONITIS: ICD-10-CM

## 2021-12-10 DIAGNOSIS — Z13.1 SCREENING FOR DIABETES MELLITUS: ICD-10-CM

## 2021-12-10 DIAGNOSIS — I10 ESSENTIAL HYPERTENSION: ICD-10-CM

## 2021-12-10 PROCEDURE — 3078F DIAST BP <80 MM HG: CPT | Performed by: NURSE PRACTITIONER

## 2021-12-10 PROCEDURE — 3008F BODY MASS INDEX DOCD: CPT | Performed by: NURSE PRACTITIONER

## 2021-12-10 PROCEDURE — 99214 OFFICE O/P EST MOD 30 MIN: CPT | Performed by: NURSE PRACTITIONER

## 2021-12-10 PROCEDURE — 3074F SYST BP LT 130 MM HG: CPT | Performed by: NURSE PRACTITIONER
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
## 2021-12-13 RX ORDER — ROSUVASTATIN CALCIUM 10 MG/1
TABLET, COATED ORAL
Qty: 90 TABLET | Refills: 0 | Status: SHIPPED | OUTPATIENT
Start: 2021-12-13 | End: 2022-01-21

## 2021-12-13 NOTE — TELEPHONE ENCOUNTER
Last OV: 12/10/21 acute f/u- SD  Last PE: 8/19/20 A. S. Future Appointments   Date Time Provider Haley Barkley   1/26/2022  3:20 PM DO SUSHANT Mcclain [id number]        Latest labs: 4/12/21 TSH, CMP and CBC.   There are pending labs

## 2021-12-15 ENCOUNTER — IMMUNIZATION (OUTPATIENT)
Dept: LAB | Facility: HOSPITAL | Age: 56
End: 2021-12-15
Attending: EMERGENCY MEDICINE
Payer: COMMERCIAL

## 2021-12-15 DIAGNOSIS — Z23 NEED FOR VACCINATION: Primary | ICD-10-CM

## 2021-12-15 PROCEDURE — 0004A SARSCOV2 VAC 30MCG/0.3ML IM: CPT

## 2021-12-20 NOTE — TELEPHONE ENCOUNTER
Future Appointments   Date Time Provider Haley Barkley   1/21/2022 11:00 AM ELLA Cordon [facility]

## 2022-01-12 ENCOUNTER — OFFICE VISIT (OUTPATIENT)
Dept: NEUROLOGY | Facility: CLINIC | Age: 57
End: 2022-01-12
Payer: COMMERCIAL

## 2022-01-12 VITALS
RESPIRATION RATE: 16 BRPM | SYSTOLIC BLOOD PRESSURE: 142 MMHG | BODY MASS INDEX: 27 KG/M2 | HEART RATE: 78 BPM | WEIGHT: 185 LBS | DIASTOLIC BLOOD PRESSURE: 82 MMHG

## 2022-01-12 DIAGNOSIS — G25.0 ESSENTIAL TREMOR: Primary | ICD-10-CM

## 2022-01-12 PROCEDURE — 99213 OFFICE O/P EST LOW 20 MIN: CPT | Performed by: OTHER

## 2022-01-12 PROCEDURE — 3079F DIAST BP 80-89 MM HG: CPT | Performed by: OTHER

## 2022-01-12 PROCEDURE — 3077F SYST BP >= 140 MM HG: CPT | Performed by: OTHER

## 2022-01-12 NOTE — PROGRESS NOTES
Patient reports he continues to get tremors occasionally, but they have improved. Patient reports the primidone is making him feel \"woozy\" in the morning.

## 2022-01-12 NOTE — PROGRESS NOTES
Neurology H&P    [place] Patient Status:  No patient class for patient encounter    2/3/1965 MRN [id number]   Location [address] [facility], [address] [place] [address] Drive, [address] [place] [address] Attending No att. providers found   Hosp Day # 0 PCP MD Mitali Dialhita Bottoms clotrimazole-betamethasone 1-0.05 % External Cream Apply 1 Application topically 2 (two) times daily as needed. 30 g 0   • aspirin 81 MG Oral Tab Take 1 tablet (81 mg total) by mouth daily.   0   • Sertraline HCl 50 MG Oral Tab Take 1 tablet (50 mg total) b SUBSTANCE, EPIDURAL/SUBARACHNOID; LUMBAR/SACRAL N/A 3/4/2015    Procedure: LUMBAR EPIDURAL;  Surgeon: Stefani Felix MD;  Location: [facility] FOR PAIN MANAGEMENT   • INJECTION, W/WO CONTRAST, DX/THERAPEUTIC SUBSTANCE, EPIDURAL/SUBARACHNOID; LUMBAR/SACR intact, strong eye closure, face is symmetric, no dysarthria, tongue midline,  no tongue fasciculations or atrophy, strong shoulder shrug.     MOTOR EXAMINATION: normal tone, no fasciculations, normal strength throughout in UEs and LEs      SENSORY EXAMINAT

## 2022-01-12 NOTE — PATIENT INSTRUCTIONS
After your visit at the [facility] office today,  please direct any follow up questions or medication needs to the staff in our [place] office so that your concerns may be promptly addressed.   We are available through [employer] or at the numbers below: picked up in office. • Please allow the office 2-3 business days to fill the prescription. • Patient must present photo ID at time of . PLEASE NOTE: PRESCRIPTIONS MUST BE PICKED UP PRIOR TO 3:00PM MONDAY-FRIDAY    Scheduling Tests:     If your ph submitting forms to office staff. • Form completion may require an additional fee. • A signed Release of Information (TOBIN) must be on file before forms may be submitted. When dropping off forms, please ask the  for this paper.    • Failure

## 2022-01-21 ENCOUNTER — OFFICE VISIT (OUTPATIENT)
Dept: INTERNAL MEDICINE CLINIC | Facility: CLINIC | Age: 57
End: 2022-01-21
Payer: COMMERCIAL

## 2022-01-21 VITALS
BODY MASS INDEX: 26.36 KG/M2 | HEART RATE: 78 BPM | OXYGEN SATURATION: 99 % | SYSTOLIC BLOOD PRESSURE: 138 MMHG | RESPIRATION RATE: 16 BRPM | DIASTOLIC BLOOD PRESSURE: 84 MMHG | HEIGHT: 68.9 IN | WEIGHT: 178 LBS | TEMPERATURE: 98 F

## 2022-01-21 DIAGNOSIS — M25.512 CHRONIC LEFT SHOULDER PAIN: ICD-10-CM

## 2022-01-21 DIAGNOSIS — M54.16 LUMBAR RADICULITIS: ICD-10-CM

## 2022-01-21 DIAGNOSIS — L81.9 DISCOLORATION OF SKIN: ICD-10-CM

## 2022-01-21 DIAGNOSIS — R73.9 HYPERGLYCEMIA: ICD-10-CM

## 2022-01-21 DIAGNOSIS — F41.9 ANXIETY: ICD-10-CM

## 2022-01-21 DIAGNOSIS — G25.0 ESSENTIAL TREMOR: ICD-10-CM

## 2022-01-21 DIAGNOSIS — I10 ESSENTIAL HYPERTENSION: ICD-10-CM

## 2022-01-21 DIAGNOSIS — G89.29 CHRONIC LEFT SHOULDER PAIN: ICD-10-CM

## 2022-01-21 DIAGNOSIS — Z00.00 ROUTINE GENERAL MEDICAL EXAMINATION AT A HEALTH CARE FACILITY: Primary | ICD-10-CM

## 2022-01-21 DIAGNOSIS — E78.00 PURE HYPERCHOLESTEROLEMIA: ICD-10-CM

## 2022-01-21 PROCEDURE — 3008F BODY MASS INDEX DOCD: CPT | Performed by: NURSE PRACTITIONER

## 2022-01-21 PROCEDURE — 3075F SYST BP GE 130 - 139MM HG: CPT | Performed by: NURSE PRACTITIONER

## 2022-01-21 PROCEDURE — 99396 PREV VISIT EST AGE 40-64: CPT | Performed by: NURSE PRACTITIONER

## 2022-01-21 PROCEDURE — 3079F DIAST BP 80-89 MM HG: CPT | Performed by: NURSE PRACTITIONER

## 2022-01-21 RX ORDER — ROSUVASTATIN CALCIUM 10 MG/1
10 TABLET, COATED ORAL EVERY EVENING
Qty: 90 TABLET | Refills: 3 | Status: SHIPPED | OUTPATIENT
Start: 2022-01-21

## 2022-01-21 RX ORDER — ESCITALOPRAM OXALATE 10 MG/1
10 TABLET ORAL DAILY
Qty: 90 TABLET | Refills: 1 | Status: SHIPPED | OUTPATIENT
Start: 2022-01-21

## 2022-01-21 RX ORDER — LISINOPRIL 10 MG/1
10 TABLET ORAL DAILY
Qty: 90 TABLET | Refills: 3 | Status: SHIPPED | OUTPATIENT
Start: 2022-01-21
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
## 2022-01-26 ENCOUNTER — TELEPHONE (OUTPATIENT)
Dept: INTERNAL MEDICINE CLINIC | Facility: CLINIC | Age: 57
End: 2022-01-26

## 2022-01-26 NOTE — TELEPHONE ENCOUNTER
Pt notified of all. States he does not feel he should take lexapro during day because he's a  and needs to be alert. The other night he states it made him feel a little weird. Advised there is an adjustment period for the medication.

## 2022-01-26 NOTE — TELEPHONE ENCOUNTER
Ok to try when he has days off. If he does not take long term, I need to know as we will need to refer him for other options due to his anxiety.

## 2022-01-26 NOTE — TELEPHONE ENCOUNTER
Pt stated SD prescribed the below medication and he read up on it and he's on a low dose aspirin regimen and is concern because it may cause a stomach bleeding.   Pt also added that this medication is keeping him up, not slept well for the last several days

## 2022-01-26 NOTE — TELEPHONE ENCOUNTER
Ok to take with ASA  Is he taking the medication in the morning or night? Take morning. Continue with 5mg. Side effects can last 7-10 days. I suggest he continue to take the medication.   If at any time he opts not to take the medication, please let

## 2022-05-17 ENCOUNTER — OFFICE VISIT (OUTPATIENT)
Dept: NEUROLOGY | Facility: CLINIC | Age: 57
End: 2022-05-17
Payer: COMMERCIAL

## 2022-05-17 VITALS
SYSTOLIC BLOOD PRESSURE: 140 MMHG | DIASTOLIC BLOOD PRESSURE: 78 MMHG | RESPIRATION RATE: 16 BRPM | WEIGHT: 172 LBS | BODY MASS INDEX: 25 KG/M2 | HEART RATE: 72 BPM

## 2022-05-17 DIAGNOSIS — G25.0 ESSENTIAL TREMOR: ICD-10-CM

## 2022-05-17 PROCEDURE — 99213 OFFICE O/P EST LOW 20 MIN: CPT | Performed by: OTHER

## 2022-05-17 PROCEDURE — 3078F DIAST BP <80 MM HG: CPT | Performed by: OTHER

## 2022-05-17 PROCEDURE — 3077F SYST BP >= 140 MM HG: CPT | Performed by: OTHER

## 2022-05-17 RX ORDER — PRIMIDONE 50 MG/1
100 TABLET ORAL NIGHTLY
Qty: 180 TABLET | Refills: 1 | Status: SHIPPED | OUTPATIENT
Start: 2022-05-17

## 2022-06-13 ENCOUNTER — TELEPHONE (OUTPATIENT)
Dept: INTERNAL MEDICINE CLINIC | Facility: CLINIC | Age: 57
End: 2022-06-13

## 2022-06-13 ENCOUNTER — OFFICE VISIT (OUTPATIENT)
Dept: INTERNAL MEDICINE CLINIC | Facility: CLINIC | Age: 57
End: 2022-06-13
Payer: COMMERCIAL

## 2022-06-13 VITALS
BODY MASS INDEX: 25.62 KG/M2 | DIASTOLIC BLOOD PRESSURE: 88 MMHG | HEART RATE: 84 BPM | HEIGHT: 69 IN | TEMPERATURE: 99 F | OXYGEN SATURATION: 97 % | WEIGHT: 173 LBS | SYSTOLIC BLOOD PRESSURE: 146 MMHG

## 2022-06-13 DIAGNOSIS — S05.90XA EYE INJURY, INITIAL ENCOUNTER: Primary | ICD-10-CM

## 2022-06-13 PROCEDURE — 3008F BODY MASS INDEX DOCD: CPT | Performed by: NURSE PRACTITIONER

## 2022-06-13 PROCEDURE — 99214 OFFICE O/P EST MOD 30 MIN: CPT | Performed by: NURSE PRACTITIONER

## 2022-06-13 PROCEDURE — 3079F DIAST BP 80-89 MM HG: CPT | Performed by: NURSE PRACTITIONER

## 2022-06-13 PROCEDURE — 3077F SYST BP >= 140 MM HG: CPT | Performed by: NURSE PRACTITIONER

## 2022-06-13 NOTE — TELEPHONE ENCOUNTER
Patient states a week ago he was squatting outside, lost his balance, fell on his right side, hit his face(right eye) in the grass, area was sore but then [place] a flight [place] and eye swelled shut, cheek and pressure in his eye, even his teeth hurt, applied ice several times, now puffy and tender. Pt denies vision issues. Pt notified per SD we will see him at his appt today 6/13 at 6pm.  Pt verbalizes understanding.

## 2022-06-14 ENCOUNTER — TELEPHONE (OUTPATIENT)
Dept: INTERNAL MEDICINE CLINIC | Facility: CLINIC | Age: 57
End: 2022-06-14

## 2022-06-14 ENCOUNTER — HOSPITAL ENCOUNTER (OUTPATIENT)
Dept: CT IMAGING | Age: 57
Discharge: HOME | End: 2022-06-14
Attending: NURSE PRACTITIONER
Payer: COMMERCIAL

## 2022-06-14 DIAGNOSIS — S05.90XA EYE INJURY, INITIAL ENCOUNTER: ICD-10-CM

## 2022-06-14 PROCEDURE — 76377 3D RENDER W/INTRP POSTPROCES: CPT | Performed by: NURSE PRACTITIONER

## 2022-06-14 PROCEDURE — 70480 CT ORBIT/EAR/FOSSA W/O DYE: CPT | Performed by: NURSE PRACTITIONER

## 2022-06-14 NOTE — TELEPHONE ENCOUNTER
Pt spouse Bettie Reynoso calling, she spoke with insurance regarding CT and they told her they need prior authorization before it would be covered.  Please advise and let Bettie Reynoso know asap as CT is already scheduled for today at 10:30

## 2022-06-15 ENCOUNTER — TELEPHONE (OUTPATIENT)
Dept: NEUROLOGY | Facility: CLINIC | Age: 57
End: 2022-06-15

## 2022-06-15 NOTE — TELEPHONE ENCOUNTER
pt stated he picked up the primidone 50 MG Oral Tab but noticed on the bottle that 50 mg is too high and he usually takes the 10 mg. pt is requesting a call back to discuss the right dosage.

## 2022-06-16 NOTE — TELEPHONE ENCOUNTER
Pt states that 20 mg was the maximum dose of primidone he was willing to take, states he feels dizzy with anything over 15 mg.   Routed to provider to advise

## 2022-06-16 NOTE — TELEPHONE ENCOUNTER
I called Hamida Leahy back regarding his primidone dose. I explained that primidone does not come in 10mg tablets and that he has previously been given the lowest dose of 50mg.  He states that he did not know this and maybe made a mistake and that his current dose is fine

## 2022-06-27 ENCOUNTER — TELEPHONE (OUTPATIENT)
Dept: INTERNAL MEDICINE CLINIC | Facility: CLINIC | Age: 57
End: 2022-06-27

## 2022-06-27 DIAGNOSIS — R09.81 SINUS CONGESTION: Primary | ICD-10-CM

## 2022-06-27 NOTE — TELEPHONE ENCOUNTER
Pt saw eye MD and they said he should see ENT-pt calling to see who we can refer him to?  Saw Donna Waggoner who we referred him to and she suggest ENT-sinus issues

## 2022-06-27 NOTE — TELEPHONE ENCOUNTER
LOV 6/13/22 with SD. Dr David Roberts would like pt to see ENT. SD, do you want pt to see ENT for sinus issues?

## 2022-08-25 RX ORDER — ESCITALOPRAM OXALATE 10 MG/1
TABLET ORAL
Qty: 90 TABLET | Refills: 1 | Status: SHIPPED | OUTPATIENT
Start: 2022-08-25

## 2022-08-25 NOTE — TELEPHONE ENCOUNTER
Last OV: 6/13/22 acute  Last PE: 1/21/22    No future appointments. Latest labs: 12/17/21 PSA, TSH, Lipid, CMP and CBC    Latest RX: ESCITALOPRAM 10 MG TABLET 90 tabs 1 refill on 1/21/22    Per protocol, not on protocol. Rx pending.

## 2022-08-26 ENCOUNTER — TELEPHONE (OUTPATIENT)
Dept: INTERNAL MEDICINE CLINIC | Facility: CLINIC | Age: 57
End: 2022-08-26

## 2022-08-26 DIAGNOSIS — Z12.11 SCREEN FOR COLON CANCER: Primary | ICD-10-CM

## 2022-08-26 NOTE — TELEPHONE ENCOUNTER
Patient notified SD would like pt to see Dr Roberto Maravilla. Pt verbalizes understanding and would like a [facility] message to be sent with info. [facility] Message sent:    Blessing Vivas would like you to schedule with Dr Shekhar Morse at [phone]. Please call their office to schedule and call if any problems scheduling.     Thank you,  Altagracia Perry RN

## 2022-10-04 ENCOUNTER — OFFICE VISIT (OUTPATIENT)
Facility: LOCATION | Age: 57
End: 2022-10-04
Payer: COMMERCIAL

## 2022-10-04 DIAGNOSIS — E78.00 PURE HYPERCHOLESTEROLEMIA: ICD-10-CM

## 2022-10-04 DIAGNOSIS — I10 ESSENTIAL HYPERTENSION: ICD-10-CM

## 2022-10-04 DIAGNOSIS — D12.6 TUBULAR ADENOMA OF COLON: Primary | ICD-10-CM

## 2022-10-04 DIAGNOSIS — Z80.0 FAMILY HISTORY OF COLON CANCER IN FATHER: ICD-10-CM

## 2022-10-04 PROCEDURE — 99203 OFFICE O/P NEW LOW 30 MIN: CPT | Performed by: COLON & RECTAL SURGERY

## 2022-10-04 RX ORDER — POLYETHYLENE GLYCOL 3350, SODIUM CHLORIDE, SODIUM BICARBONATE, POTASSIUM CHLORIDE 420; 11.2; 5.72; 1.48 G/4L; G/4L; G/4L; G/4L
POWDER, FOR SOLUTION ORAL
Qty: 1 EACH | Refills: 0 | Status: SHIPPED | OUTPATIENT
Start: 2022-10-04

## 2022-10-04 NOTE — PATIENT INSTRUCTIONS
The patient presents today in consultation for a colonoscopy. The patient has had a prior colonoscopy. Dr. Agusto Rosa performed his most recent colonoscopy on 12/19/2017. He found internal hemorrhoids, diverticulosis and tubular adenomas at that time. The patient denies diarrhea, constipation or alternating between the two. The patient denies having any blood, mucus or dark tarry stools. The patient denies having any narrowing of stools. The patient denies any unintentional weight loss. The patient denies any abdominal pain or distention. The patient denies fevers, chills, nausea or vomiting. The patient's father has a history of colon cancer. The patient denies any other first or second-degree relatives with a history of colon cancer/colon polyps. The patient denies any first or second-degree family history of uterine cancer. The patient has a past medical history significant for hypertension and hyperlipidemia. He takes 81 mg of aspirin daily. The patient has had 4 prior inguinal hernia repairs. Clinical examination of the abdomen reveals it to be soft, nondistended, nontender, bowel sounds are normal activity normal pitch. There  is no rebounding tenderness or guarding. There are no signs of ascites or peritonitis. The liver and spleen are nonpalpable. There are no palpable masses or hernias. He has well-healed laparoscopic incision sites. The patient will be scheduled to undergo a colonoscopy on 11/4/2022 at the [facility] for surgery. The patient states that he typically has difficulty passing flatus following his bowel movements. I instructed him to remind me of this at the time of his colonoscopy, so I can use less air when exiting his colon. All risks, benefits, complications and alternatives to the proposed procedure(s) were fully discussed with the patient. All questions from the patient were answered in detail.  A description of the procedure(s) possible outcomes was fully discussed. The patient seemed to understand the conversation and its details. Consent for the procedure(s) was confirmed with the patient.

## 2022-10-05 PROBLEM — D12.6 TUBULAR ADENOMA OF COLON: Status: ACTIVE | Noted: 2022-10-05

## 2022-10-05 PROBLEM — Z80.0 FAMILY HISTORY OF COLON CANCER IN FATHER: Status: ACTIVE | Noted: 2022-10-05

## 2022-10-27 ENCOUNTER — TELEPHONE (OUTPATIENT)
Facility: LOCATION | Age: 57
End: 2022-10-27

## 2022-11-04 ENCOUNTER — SURGERY CENTER DOCUMENTATION (OUTPATIENT)
Dept: SURGERY | Age: 57
End: 2022-11-04

## 2022-11-04 NOTE — PROCEDURES
[facility].  Op Note    Jesus Manuel Rowley Location: OR   CSN [id number] MRN [id number]   Admission Date (Not on file) Operation Date 11/4/2022   Attending Physician No att. providers found Operating Physician Flora Colon MD     Pre-Operative Diagnosis: Screening exam    Post-Operative Diagnosis: normal    Procedure Performed: Colonoscopy    Surgeon: Flora Colon MD    Anesthesia: MAC      History of present illness:  Screening colonoscopy    Operative findings:  Normal colon, internal hemorrhoids grade 3    Operative summary:  Following adequate IV sedation, the patient was placed in the left lateral decubitus position on the operating room table. Digital rectal examination was performed. The colonoscope was inserted, and passed to the end of the colon. Upon withdrawal of the scope, the prep was rated as follows and the [facility] bowel prep scale:  3    0. Unprepared colon segment with stool but cannot be cleared  1. Portion of mucosa in segments seen after cleaning, but other areas not seen because of retained material  2. Minor residual material after cleaning, but mucosa of segment generally well seen  3. Entire mucosa of segment well seen after cleaning        Digital rectal examination was performed, the rectal exam had the following findings:   Hemorrhoids    Landmarks were identified confirming the location of the colonoscope in the cecum, including the transverse cecal fold, and the ileocecal valve. Upon withdrawal of the scope, the patient had the following findings:  The patient does not have polyps. The patient does not have diverticulosis. The patient does not have inflammation. This patient has not had a previous colon resection. The scope was retroverted in the anal canal, the anal canal had the following findings:    The patient does have internal hemorrhoids. The patient does not have anal canal polyps.     The patient does not have hypertrophied anal papillae. The procedures performed during the procedure other than colonoscopy are listed as follows:  None    The scope was withdrawn, the patient was taken to the recovery room in stable postoperative condition. Pathologic specimens:  None     Complications: None      Addendum: This patient can be returned to the general screening population. Current recommendations for screening include colonoscopy every 10 years starting at age 39. If this patient displays any signs and symptoms consistent with colon cancer, the patient should return to me immediately, even if it is prior to 10 years, or age 39.         Karla Smith MD  11/4/2022  12:36 PM

## 2022-11-06 DIAGNOSIS — G25.0 ESSENTIAL TREMOR: ICD-10-CM

## 2022-11-07 RX ORDER — PRIMIDONE 50 MG/1
TABLET ORAL
Qty: 180 TABLET | Refills: 0 | Status: SHIPPED | OUTPATIENT
Start: 2022-11-07
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
## 2023-02-06 ENCOUNTER — TELEPHONE (OUTPATIENT)
Dept: INTERNAL MEDICINE CLINIC | Facility: CLINIC | Age: 58
End: 2023-02-06

## 2023-02-06 NOTE — TELEPHONE ENCOUNTER
Pt going through alcohol withdraw he believes, Had 2 drinks yesterday, last night was sweating and coughing, today he feels like he is trembling. He is scared and needs some guidance.

## 2023-02-06 NOTE — TELEPHONE ENCOUNTER
Patient calling today to discuss process for alcohol withdrawal. Patient reports daily intake ov 4-5 mixed drinks daily for the last two years. Starting to see impacts with work and relationships. Last alcohol intake last night around 8-10 PM. No hx of seizures. Does report black outs with drinking. Has tried to withdraw in the past and only got a day or two and relapsed. Patient at this time reports shaky hands and feeling jittery. Advised this is a very serious process which he would need to be seen in the ER for to be monitored through withdraw. Patient states that he is ready for change and \"just can't do it anymore\". He states maybe he would go tomorrow since his wife is off work. Recommended he go now being that he is in the right mind set to stop and that his last reported drink would be yesterday verses relapsing and starting tomorrow unless he'd change his mind. He voiced that he will go to the ER at this time as indicated.

## 2023-02-08 ENCOUNTER — OFFICE VISIT (OUTPATIENT)
Dept: INTERNAL MEDICINE CLINIC | Facility: CLINIC | Age: 58
End: 2023-02-08
Payer: COMMERCIAL

## 2023-02-08 VITALS
SYSTOLIC BLOOD PRESSURE: 118 MMHG | BODY MASS INDEX: 25.62 KG/M2 | WEIGHT: 179 LBS | OXYGEN SATURATION: 96 % | HEART RATE: 86 BPM | HEIGHT: 70 IN | DIASTOLIC BLOOD PRESSURE: 80 MMHG | TEMPERATURE: 99 F

## 2023-02-08 DIAGNOSIS — Z78.9 ALCOHOL USE: Primary | ICD-10-CM

## 2023-02-08 DIAGNOSIS — F41.9 ANXIETY: ICD-10-CM

## 2023-02-08 PROCEDURE — 3074F SYST BP LT 130 MM HG: CPT | Performed by: NURSE PRACTITIONER

## 2023-02-08 PROCEDURE — 3079F DIAST BP 80-89 MM HG: CPT | Performed by: NURSE PRACTITIONER

## 2023-02-08 PROCEDURE — 99214 OFFICE O/P EST MOD 30 MIN: CPT | Performed by: NURSE PRACTITIONER

## 2023-02-08 PROCEDURE — 3008F BODY MASS INDEX DOCD: CPT | Performed by: NURSE PRACTITIONER

## 2023-02-08 NOTE — TELEPHONE ENCOUNTER
Pt called asking if we received ED results. Advised would be best to schedule ED f/u. Pt agrees and thankful.      Future Appointments   Date Time Provider Haley Barkley   2/8/2023  1:40 PM ELLA Pacheco [facility] [facility]   4/18/2023 10:40 AM DO SUSHANT Schumacher [id number]

## 2023-02-21 ENCOUNTER — TELEPHONE (OUTPATIENT)
Dept: INTERNAL MEDICINE CLINIC | Facility: CLINIC | Age: 58
End: 2023-02-21

## 2023-02-21 RX ORDER — LORAZEPAM 1 MG/1
0.5 TABLET ORAL DAILY PRN
Qty: 10 TABLET | Refills: 0 | Status: SHIPPED | OUTPATIENT
Start: 2023-02-21 | End: 2023-02-28

## 2023-02-21 NOTE — TELEPHONE ENCOUNTER
Ativan was to be used on PRN bases only. He was given names for psychiatry follow up for his alcohol dependence as well as medication management. Please confirm he has made these appts. Will order a few more ativan until this appt to use only as needed.

## 2023-02-24 RX ORDER — ESCITALOPRAM OXALATE 10 MG/1
TABLET ORAL
Qty: 90 TABLET | Refills: 0 | Status: SHIPPED | OUTPATIENT
Start: 2023-02-24

## 2023-02-24 RX ORDER — ROSUVASTATIN CALCIUM 10 MG/1
TABLET, COATED ORAL
Qty: 90 TABLET | Refills: 0 | Status: SHIPPED | OUTPATIENT
Start: 2023-02-24

## 2023-03-13 ENCOUNTER — OFFICE VISIT (OUTPATIENT)
Dept: INTERNAL MEDICINE CLINIC | Facility: CLINIC | Age: 58
End: 2023-03-13

## 2023-03-13 VITALS
WEIGHT: 183 LBS | DIASTOLIC BLOOD PRESSURE: 80 MMHG | RESPIRATION RATE: 16 BRPM | SYSTOLIC BLOOD PRESSURE: 132 MMHG | HEART RATE: 68 BPM | HEIGHT: 70 IN | TEMPERATURE: 97 F | BODY MASS INDEX: 26.2 KG/M2

## 2023-03-13 DIAGNOSIS — H00.12 CHALAZION OF RIGHT LOWER EYELID: Primary | ICD-10-CM

## 2023-03-13 RX ORDER — CEPHALEXIN 500 MG/1
500 CAPSULE ORAL 3 TIMES DAILY
Qty: 21 CAPSULE | Refills: 0 | Status: SHIPPED | OUTPATIENT
Start: 2023-03-13

## 2023-05-03 ENCOUNTER — PATIENT OUTREACH (OUTPATIENT)
Facility: LOCATION | Age: 58
End: 2023-05-03

## 2023-05-17 ENCOUNTER — TELEPHONE (OUTPATIENT)
Dept: NEUROLOGY | Facility: CLINIC | Age: 58
End: 2023-05-17

## 2023-05-19 DIAGNOSIS — G25.0 ESSENTIAL TREMOR: ICD-10-CM

## 2023-05-19 RX ORDER — PRIMIDONE 50 MG/1
TABLET ORAL
Qty: 30 TABLET | Refills: 0 | Status: SHIPPED | OUTPATIENT
Start: 2023-05-19

## 2023-05-19 NOTE — TELEPHONE ENCOUNTER
Medication: PRIMIDONE 50 MG     Date of last refill: 11/7/22 (#180/0)  [place]      Last office visit: 5/17/2022  Due back to clinic per last office note:  6 months  Date next office visit scheduled:    Future Appointments   Date Time Provider Haley Barkley   5/31/2023  3:20 PM DO KAMLA TavaresIWENOCH [id number]   6/5/2023 10:00 AM Cathy Casno [facility] LOMGBHINAP LOMG NAPERVI           Last OV note recommendation:    1. Tremor -essential tremor with an anxiety component? - Mysoline 100mg PO at bedtime  - Warned him against taking this with Xanax and to let his work know he is taking this for tremor      Return in about 6 months (around 11/17/2022).

## 2023-05-26 RX ORDER — LISINOPRIL 10 MG/1
TABLET ORAL
Qty: 90 TABLET | Refills: 0 | Status: SHIPPED | OUTPATIENT
Start: 2023-05-26

## 2023-05-31 ENCOUNTER — OFFICE VISIT (OUTPATIENT)
Dept: NEUROLOGY | Facility: CLINIC | Age: 58
End: 2023-05-31
Payer: COMMERCIAL

## 2023-05-31 VITALS
RESPIRATION RATE: 16 BRPM | HEART RATE: 75 BPM | DIASTOLIC BLOOD PRESSURE: 74 MMHG | BODY MASS INDEX: 27 KG/M2 | WEIGHT: 186 LBS | SYSTOLIC BLOOD PRESSURE: 124 MMHG

## 2023-05-31 DIAGNOSIS — G25.0 ESSENTIAL TREMOR: Primary | ICD-10-CM

## 2023-05-31 PROCEDURE — 3078F DIAST BP <80 MM HG: CPT | Performed by: OTHER

## 2023-05-31 PROCEDURE — 99213 OFFICE O/P EST LOW 20 MIN: CPT | Performed by: OTHER

## 2023-05-31 PROCEDURE — 3074F SYST BP LT 130 MM HG: CPT | Performed by: OTHER

## 2023-05-31 RX ORDER — PRIMIDONE 50 MG/1
150 TABLET ORAL NIGHTLY
Qty: 90 TABLET | Refills: 2 | Status: SHIPPED | OUTPATIENT
Start: 2023-05-31

## 2023-06-01 DIAGNOSIS — G25.0 ESSENTIAL TREMOR: ICD-10-CM

## 2023-06-02 DIAGNOSIS — Z13.1 SCREENING FOR DIABETES MELLITUS: ICD-10-CM

## 2023-06-02 DIAGNOSIS — Z13.29 SCREENING FOR THYROID DISORDER: ICD-10-CM

## 2023-06-02 DIAGNOSIS — E78.00 PURE HYPERCHOLESTEROLEMIA: ICD-10-CM

## 2023-06-02 DIAGNOSIS — Z12.5 SCREENING FOR PROSTATE CANCER: Primary | ICD-10-CM

## 2023-06-02 DIAGNOSIS — Z13.220 SCREENING, LIPID: ICD-10-CM

## 2023-06-02 DIAGNOSIS — F41.9 ANXIETY: ICD-10-CM

## 2023-06-02 DIAGNOSIS — Z13.0 SCREENING FOR BLOOD DISEASE: ICD-10-CM

## 2023-06-02 RX ORDER — ROSUVASTATIN CALCIUM 10 MG/1
TABLET, COATED ORAL
Qty: 90 TABLET | Refills: 0 | Status: SHIPPED | OUTPATIENT
Start: 2023-06-02

## 2023-06-02 RX ORDER — PRIMIDONE 50 MG/1
TABLET ORAL
Qty: 30 TABLET | Refills: 0 | OUTPATIENT
Start: 2023-06-02

## 2023-06-02 RX ORDER — ESCITALOPRAM OXALATE 10 MG/1
TABLET ORAL
Qty: 90 TABLET | Refills: 0 | Status: SHIPPED | OUTPATIENT
Start: 2023-06-02

## 2023-06-02 NOTE — TELEPHONE ENCOUNTER
Cholesterol Medication Protocol Failed 06/02/2023 02:41 AM   Protocol Details  Lipid panel within past 12 months    ALT < 80    ALT resulted within past year    Appointment within past 12 or next 3 months     Pt last seen for hospital Follow up 2/8/2023    Last annual 1/21/22 -     PSR's patient due for annual visit    Routing refill request to SD for review and recommendations on refills. Last lipid done 12/17/2021        ESCITALOPRAM 10 MG Oral Tab 90 tablet 0 2/24/2023     Sig: TAKE 1 TABLET BY MOUTH EVERY DAY    Sent to pharmacy as: Escitalopram Oxalate 10 MG Oral Tablet (Lexapro)    E-Prescribing Status: Receipt confirmed by pharmacy (2/24/2023 10:36 AM CST)      Pharmacy    [facility] [address] , [address]  [address] [place] [address], [phone], [phone]

## 2023-06-02 NOTE — TELEPHONE ENCOUNTER
He is over due for labs and CPE>  He has been struggling with some other issues and we have not done routine care.     Please have him complete labs and schedule CPE,  Thanks

## 2023-06-20 LAB
ABSOLUTE BASOPHILS: 89 CELLS/UL (ref 0–200)
ABSOLUTE EOSINOPHILS: 389 CELLS/UL (ref 15–500)
ABSOLUTE LYMPHOCYTES: 1588 CELLS/UL (ref 850–3900)
ABSOLUTE MONOCYTES: 608 CELLS/UL (ref 200–950)
ABSOLUTE NEUTROPHILS: 5427 CELLS/UL (ref 1500–7800)
ALBUMIN/GLOBULIN RATIO: 1.7 (CALC) (ref 1–2.5)
ALBUMIN: 4.4 G/DL (ref 3.6–5.1)
ALKALINE PHOSPHATASE: 78 U/L (ref 35–144)
ALT: 34 U/L (ref 9–46)
AST: 21 U/L (ref 10–35)
BASOPHILS: 1.1 %
BILIRUBIN, TOTAL: 0.4 MG/DL (ref 0.2–1.2)
BUN: 16 MG/DL (ref 7–25)
CALCIUM: 9.8 MG/DL (ref 8.6–10.3)
CARBON DIOXIDE: 25 MMOL/L (ref 20–32)
CHLORIDE: 106 MMOL/L (ref 98–110)
CHOL/HDLC RATIO: 3.1 (CALC)
CHOLESTEROL, TOTAL: 168 MG/DL
CREATININE: 0.92 MG/DL (ref 0.7–1.3)
EGFR: 96 ML/MIN/1.73M2
EOSINOPHILS: 4.8 %
GLOBULIN: 2.6 G/DL (CALC) (ref 1.9–3.7)
GLUCOSE: 97 MG/DL (ref 65–99)
HDL CHOLESTEROL: 54 MG/DL
HEMATOCRIT: 47.7 % (ref 38.5–50)
HEMOGLOBIN: 15.9 G/DL (ref 13.2–17.1)
LDL-CHOLESTEROL: 93 MG/DL (CALC)
LYMPHOCYTES: 19.6 %
MCH: 29.3 PG (ref 27–33)
MCHC: 33.3 G/DL (ref 32–36)
MCV: 87.8 FL (ref 80–100)
MONOCYTES: 7.5 %
MPV: 9.3 FL (ref 7.5–12.5)
NEUTROPHILS: 67 %
NON-HDL CHOLESTEROL: 114 MG/DL (CALC)
PLATELET COUNT: 289 THOUSAND/UL (ref 140–400)
POTASSIUM: 4.5 MMOL/L (ref 3.5–5.3)
PROTEIN, TOTAL: 7 G/DL (ref 6.1–8.1)
PSA, TOTAL: 0.8 NG/ML
RDW: 12.8 % (ref 11–15)
RED BLOOD CELL COUNT: 5.43 MILLION/UL (ref 4.2–5.8)
SODIUM: 139 MMOL/L (ref 135–146)
TRIGLYCERIDES: 111 MG/DL
TSH W/REFLEX TO FT4: 1.7 MIU/L (ref 0.4–4.5)
WHITE BLOOD CELL COUNT: 8.1 THOUSAND/UL (ref 3.8–10.8)

## 2023-07-03 ENCOUNTER — OFFICE VISIT (OUTPATIENT)
Dept: INTERNAL MEDICINE CLINIC | Facility: CLINIC | Age: 58
End: 2023-07-03
Payer: COMMERCIAL

## 2023-07-03 VITALS
OXYGEN SATURATION: 97 % | RESPIRATION RATE: 16 BRPM | HEIGHT: 70 IN | WEIGHT: 188 LBS | BODY MASS INDEX: 26.92 KG/M2 | HEART RATE: 58 BPM | SYSTOLIC BLOOD PRESSURE: 118 MMHG | DIASTOLIC BLOOD PRESSURE: 74 MMHG

## 2023-07-03 DIAGNOSIS — M54.16 LUMBAR RADICULITIS: ICD-10-CM

## 2023-07-03 DIAGNOSIS — E78.00 PURE HYPERCHOLESTEROLEMIA: ICD-10-CM

## 2023-07-03 DIAGNOSIS — F41.9 ANXIETY: ICD-10-CM

## 2023-07-03 DIAGNOSIS — Z00.00 ROUTINE GENERAL MEDICAL EXAMINATION AT A HEALTH CARE FACILITY: Primary | ICD-10-CM

## 2023-07-03 DIAGNOSIS — R15.1 FECAL SMEARING: ICD-10-CM

## 2023-07-03 DIAGNOSIS — G25.0 ESSENTIAL TREMOR: ICD-10-CM

## 2023-07-03 DIAGNOSIS — R19.5 STOOL MUCUS: ICD-10-CM

## 2023-07-03 DIAGNOSIS — R73.9 HYPERGLYCEMIA: ICD-10-CM

## 2023-07-03 DIAGNOSIS — I10 ESSENTIAL HYPERTENSION: ICD-10-CM

## 2023-07-03 PROCEDURE — 3074F SYST BP LT 130 MM HG: CPT | Performed by: NURSE PRACTITIONER

## 2023-07-03 PROCEDURE — 99396 PREV VISIT EST AGE 40-64: CPT | Performed by: NURSE PRACTITIONER

## 2023-07-03 PROCEDURE — 3078F DIAST BP <80 MM HG: CPT | Performed by: NURSE PRACTITIONER

## 2023-07-03 PROCEDURE — 3008F BODY MASS INDEX DOCD: CPT | Performed by: NURSE PRACTITIONER

## 2023-07-03 RX ORDER — ESCITALOPRAM OXALATE 10 MG/1
15 TABLET ORAL DAILY
Qty: 135 TABLET | Refills: 1 | Status: SHIPPED | OUTPATIENT
Start: 2023-07-03

## 2023-07-20 ENCOUNTER — PATIENT MESSAGE (OUTPATIENT)
Dept: INTERNAL MEDICINE CLINIC | Facility: CLINIC | Age: 58
End: 2023-07-20

## 2023-07-20 RX ORDER — ESCITALOPRAM OXALATE 10 MG/1
15 TABLET ORAL DAILY
Qty: 135 TABLET | Refills: 1 | Status: SHIPPED | OUTPATIENT
Start: 2023-07-20

## 2023-07-20 NOTE — TELEPHONE ENCOUNTER
From: Kristin Smith  To: ELLA Quintana  Sent: 7/20/2023 10:33 AM CDT  Subject: Medication    Hello, I wanted to let you know that with new dosage of the Escitalopram did actually have a little noticeable affect, I where as with the earlier dosage did not. You had mentioned that if I had wanted to try a little higher dosage to contact you. I gave the 15mg dosage at least a 2 week try and would like your opinion if upping the dosage at this time, would be a good idea, or maybe hold off.  Thank You for your time and your help, I am continuing to recover and am feeling a lot better!!!

## 2023-07-24 ENCOUNTER — TELEPHONE (OUTPATIENT)
Dept: INTERNAL MEDICINE CLINIC | Facility: CLINIC | Age: 58
End: 2023-07-24

## 2023-07-24 ENCOUNTER — OFFICE VISIT (OUTPATIENT)
Dept: FAMILY MEDICINE CLINIC | Facility: CLINIC | Age: 58
End: 2023-07-24
Payer: COMMERCIAL

## 2023-07-24 VITALS
TEMPERATURE: 98 F | HEIGHT: 70 IN | HEART RATE: 72 BPM | RESPIRATION RATE: 14 BRPM | BODY MASS INDEX: 27.2 KG/M2 | DIASTOLIC BLOOD PRESSURE: 72 MMHG | WEIGHT: 190 LBS | OXYGEN SATURATION: 95 % | SYSTOLIC BLOOD PRESSURE: 104 MMHG

## 2023-07-24 DIAGNOSIS — H01.004 BLEPHARITIS OF LEFT UPPER EYELID, UNSPECIFIED TYPE: Primary | ICD-10-CM

## 2023-07-24 PROCEDURE — 3008F BODY MASS INDEX DOCD: CPT | Performed by: NURSE PRACTITIONER

## 2023-07-24 PROCEDURE — 3078F DIAST BP <80 MM HG: CPT | Performed by: NURSE PRACTITIONER

## 2023-07-24 PROCEDURE — 99213 OFFICE O/P EST LOW 20 MIN: CPT | Performed by: NURSE PRACTITIONER

## 2023-07-24 PROCEDURE — 3074F SYST BP LT 130 MM HG: CPT | Performed by: NURSE PRACTITIONER

## 2023-07-24 RX ORDER — TOBRAMYCIN 3 MG/ML
1 SOLUTION/ DROPS OPHTHALMIC EVERY 4 HOURS
Qty: 5 ML | Refills: 0 | Status: SHIPPED | OUTPATIENT
Start: 2023-07-24 | End: 2023-07-31

## 2023-07-24 NOTE — TELEPHONE ENCOUNTER
Patient notified to go to the [facility] [address] [facility] in [facility] for evaluation for possible conjunctivitis. Pt verbalizes understanding.  LOV 7/3/23 with SD.

## 2023-07-24 NOTE — TELEPHONE ENCOUNTER
Patient states that over the weekend his L eye was itchy. This morning there is mucous and it was a little stuck. Eye is slightly red.

## 2023-08-23 RX ORDER — LISINOPRIL 10 MG/1
10 TABLET ORAL DAILY
Qty: 90 TABLET | Refills: 2 | Status: SHIPPED | OUTPATIENT
Start: 2023-08-23

## 2023-08-23 NOTE — TELEPHONE ENCOUNTER
Hypertension Medications Protocol [id number]/23/2023 12:25 AM   Protocol Details CMP or BMP in past 12 months    Last serum creatinine< 2.0    Appointment in past 6 or next 3 months          Last annual 7/3/23

## 2023-08-29 DIAGNOSIS — G25.0 ESSENTIAL TREMOR: ICD-10-CM

## 2023-08-29 RX ORDER — PRIMIDONE 50 MG/1
150 TABLET ORAL NIGHTLY
Qty: 270 TABLET | Refills: 0 | Status: SHIPPED | OUTPATIENT
Start: 2023-08-29

## 2023-08-29 RX ORDER — ROSUVASTATIN CALCIUM 10 MG/1
TABLET, COATED ORAL
Qty: 90 TABLET | Refills: 3 | Status: SHIPPED | OUTPATIENT
Start: 2023-08-29

## 2023-09-05 ENCOUNTER — TELEPHONE (OUTPATIENT)
Dept: INTERNAL MEDICINE CLINIC | Facility: CLINIC | Age: 58
End: 2023-09-05

## 2023-09-05 NOTE — TELEPHONE ENCOUNTER
Pt spouse calling in as pt has had vomiting and diarrhea. Headache as well. Covid test negative today but they want to know what else to watch out for or if appt is recommended at this time.  Spouse is sick as well, separate TE sent

## 2023-09-05 NOTE — TELEPHONE ENCOUNTER
Spoke w/ pt. Pt stated he had dinner with his wife and they went to the theater and had popcorn. [place] bloated when he got home so he went to bed. Pt felt in the middle of the night felt phlegm and started coughing due to increased saliva in his mouth. C/o loose BM as well last night. No BM yet today. Had toast and oatmeal today. Tolerating food ok, advised to stay hydrated. Denies fever. Covid negative. C/o fatigue mostly. C/o mild lightheadedness. Pt stated he has apt w/ psychologist tomorrow in person. Advised since negative and if he feels ok tomorrow, ok to go to apt. Pt stated he feels ok with monitoring symptoms at home for now. Advised to call us if any new or worsening symptoms. Pt agreeable to plan.      DEYSI EASTMAN

## 2023-09-12 ENCOUNTER — OFFICE VISIT (OUTPATIENT)
Dept: INTERNAL MEDICINE CLINIC | Facility: CLINIC | Age: 58
End: 2023-09-12
Payer: COMMERCIAL

## 2023-09-12 VITALS
DIASTOLIC BLOOD PRESSURE: 50 MMHG | WEIGHT: 193.19 LBS | HEIGHT: 70 IN | OXYGEN SATURATION: 92 % | TEMPERATURE: 97 F | SYSTOLIC BLOOD PRESSURE: 110 MMHG | HEART RATE: 86 BPM | RESPIRATION RATE: 14 BRPM | BODY MASS INDEX: 27.66 KG/M2

## 2023-09-12 DIAGNOSIS — M25.521 RIGHT ELBOW PAIN: ICD-10-CM

## 2023-09-12 DIAGNOSIS — M79.671 PAIN OF RIGHT HEEL: Primary | ICD-10-CM

## 2023-09-12 DIAGNOSIS — F41.9 ANXIETY: ICD-10-CM

## 2023-09-12 DIAGNOSIS — M25.552 HIP PAIN, LEFT: ICD-10-CM

## 2023-09-12 DIAGNOSIS — I10 ESSENTIAL HYPERTENSION: ICD-10-CM

## 2023-09-12 PROCEDURE — 3074F SYST BP LT 130 MM HG: CPT | Performed by: NURSE PRACTITIONER

## 2023-09-12 PROCEDURE — 3008F BODY MASS INDEX DOCD: CPT | Performed by: NURSE PRACTITIONER

## 2023-09-12 PROCEDURE — 3078F DIAST BP <80 MM HG: CPT | Performed by: NURSE PRACTITIONER

## 2023-09-12 PROCEDURE — 99214 OFFICE O/P EST MOD 30 MIN: CPT | Performed by: NURSE PRACTITIONER

## 2023-09-12 RX ORDER — NAPROXEN 500 MG/1
500 TABLET ORAL 2 TIMES DAILY WITH MEALS
Qty: 60 TABLET | Refills: 0 | Status: SHIPPED | OUTPATIENT
Start: 2023-09-12

## 2023-09-25 ENCOUNTER — EKG ENCOUNTER (OUTPATIENT)
Dept: LAB | Age: 58
End: 2023-09-25
Payer: COMMERCIAL

## 2023-09-25 DIAGNOSIS — Z79.899 MEDICATION MANAGEMENT: ICD-10-CM

## 2023-09-25 LAB
ATRIAL RATE: 65 BPM
P AXIS: 38 DEGREES
P-R INTERVAL: 184 MS
Q-T INTERVAL: 388 MS
QRS DURATION: 104 MS
QTC CALCULATION (BEZET): 403 MS
R AXIS: -8 DEGREES
T AXIS: 11 DEGREES
VENTRICULAR RATE: 65 BPM

## 2023-09-28 ENCOUNTER — OFFICE VISIT (OUTPATIENT)
Facility: LOCATION | Age: 58
End: 2023-09-28
Payer: COMMERCIAL

## 2023-09-28 VITALS — HEART RATE: 70 BPM | TEMPERATURE: 98 F

## 2023-09-28 DIAGNOSIS — K64.1 GRADE II HEMORRHOIDS: ICD-10-CM

## 2023-09-28 DIAGNOSIS — R15.1 FECAL SMEARING: Primary | ICD-10-CM

## 2023-09-28 PROCEDURE — 99214 OFFICE O/P EST MOD 30 MIN: CPT | Performed by: STUDENT IN AN ORGANIZED HEALTH CARE EDUCATION/TRAINING PROGRAM

## 2023-09-28 PROCEDURE — 46600 DIAGNOSTIC ANOSCOPY SPX: CPT | Performed by: STUDENT IN AN ORGANIZED HEALTH CARE EDUCATION/TRAINING PROGRAM

## 2023-09-29 PROBLEM — R15.1 FECAL SMEARING: Status: ACTIVE | Noted: 2023-09-29

## 2023-09-29 PROBLEM — K64.1 GRADE II HEMORRHOIDS: Status: ACTIVE | Noted: 2023-09-29

## 2023-10-10 RX ORDER — NAPROXEN 500 MG/1
500 TABLET ORAL 2 TIMES DAILY WITH MEALS
Qty: 60 TABLET | Refills: 0 | Status: SHIPPED | OUTPATIENT
Start: 2023-10-10

## 2023-10-10 NOTE — TELEPHONE ENCOUNTER
Last visit 9/12/23 with SD       Will try naprosyn bid x 10 days with food for variable discomforts he has described. See me if not improving. Also discussed conservative measures for each. Pain of right heel  (primary encounter diagnosis)  discussed plantar fascitiis. Wear good supported shoes. Avoid particular exercise.

## 2023-10-12 ENCOUNTER — OFFICE VISIT (OUTPATIENT)
Facility: LOCATION | Age: 58
End: 2023-10-12
Payer: COMMERCIAL

## 2023-10-12 VITALS — TEMPERATURE: 98 F | HEART RATE: 86 BPM

## 2023-10-12 DIAGNOSIS — K64.1 GRADE II HEMORRHOIDS: Primary | ICD-10-CM

## 2023-10-12 PROCEDURE — 46221 LIGATION OF HEMORRHOID(S): CPT | Performed by: STUDENT IN AN ORGANIZED HEALTH CARE EDUCATION/TRAINING PROGRAM

## 2023-10-16 NOTE — PROCEDURES
At today's office visit, we treated the left lateral internal hemorrhoid. Patient was positioned in prone jackknife with nursing chaperone present. External exam of the anus was normal.  Digital rectal exam was performed. A well-lubricated anoscope was inserted into the anal canal.  There was clear visualization of a large left lateral internal hemorrhoid. The hemorrhoid column was grasped above the dentate line and and a rubber band was applied at its base. 1 cc of 5% phenol solution was injected at the base of the hemorrhoid. The anoscope was removed and digital rectal exam confirmed satisfactory positioning of the rubber band. The patient tolerated the procedure well. However, he felt a bit dizzy initially after the procedure. We did observe him for over 10 minutes while seated in the office and offering him water. The dizziness ultimately resolved. His vitals were checked and remained within normal limits. The patient remained stable throughout the entire procedure within my office. The patient was asked to recover in my office for a few minutes prior to leaving for home. The patient should refrain from extreme sports or athletic activity, including heavy lifting. Patient can take Tylenol and/or ibuprofen as needed for pain. I advised patient to call the office with excessive bleeding with clots, fevers, urinary retention or any other concerning symptoms. We will see this patient again in 2-3 weeks for further care and treatment.

## 2023-10-24 ENCOUNTER — OFFICE VISIT (OUTPATIENT)
Dept: INTERNAL MEDICINE CLINIC | Facility: CLINIC | Age: 58
End: 2023-10-24

## 2023-10-24 VITALS
HEART RATE: 82 BPM | HEIGHT: 70 IN | DIASTOLIC BLOOD PRESSURE: 68 MMHG | TEMPERATURE: 98 F | RESPIRATION RATE: 16 BRPM | BODY MASS INDEX: 28.03 KG/M2 | OXYGEN SATURATION: 98 % | SYSTOLIC BLOOD PRESSURE: 134 MMHG | WEIGHT: 195.81 LBS

## 2023-10-24 DIAGNOSIS — F41.9 ANXIETY: ICD-10-CM

## 2023-10-24 DIAGNOSIS — M79.671 RIGHT FOOT PAIN: Primary | ICD-10-CM

## 2023-10-24 DIAGNOSIS — I10 ESSENTIAL HYPERTENSION: ICD-10-CM

## 2023-10-24 PROCEDURE — 3075F SYST BP GE 130 - 139MM HG: CPT | Performed by: NURSE PRACTITIONER

## 2023-10-24 PROCEDURE — 3078F DIAST BP <80 MM HG: CPT | Performed by: NURSE PRACTITIONER

## 2023-10-24 PROCEDURE — 99214 OFFICE O/P EST MOD 30 MIN: CPT | Performed by: NURSE PRACTITIONER

## 2023-10-24 PROCEDURE — 3008F BODY MASS INDEX DOCD: CPT | Performed by: NURSE PRACTITIONER

## 2023-10-27 ENCOUNTER — TELEPHONE (OUTPATIENT)
Dept: INTERNAL MEDICINE CLINIC | Facility: CLINIC | Age: 58
End: 2023-10-27

## 2023-10-27 NOTE — TELEPHONE ENCOUNTER
Please ask pt to obtain disk with digital copy of XR for podiatry appt.     No encounters so far of us receiving paper copy of results. Holding for paper results.

## 2023-10-27 NOTE — TELEPHONE ENCOUNTER
Pt taking naproxen without relief. Stated pain is 3-4/10 at rest, but when he gets up to start walking, goes to 8-9/10. Pain improves as he continues to walk but does not completely resolve. Pt asking if SD can send something else for him to help with his pain.     SD, any further recs for pain management? Pt scheduled with podiatry 11/10

## 2023-10-27 NOTE — TELEPHONE ENCOUNTER
Pt stated he saw SD 10/24 for his heel pain and she prescribed the below, but it's not helping. Pt stated he's on feet a lot and needs some else.  Pt wants to know if SD does prescribe something else, should he stop the Naproxen.  Pt level 3-4 when relaxing and when he starts walking he limping and his pain level 8-9/10.  Pt stated the pain subsides some once he starts walking - but it doesn't fully go away.    naproxen 500 MG Oral Tab 60 tablet 0 10/10/2023    Sig:   Take 1 tablet (500 mg total) by mouth 2 (two) times daily with meals.

## 2023-10-27 NOTE — TELEPHONE ENCOUNTER
Called and spoke to pt. Informed him pain med will likely not benefit as pain intensifies when bearing weight. Advised to try ES tylenol 1000 mg in between use of naproxen, can use up to 4x a day. Pt stated understanding and agreed to plan. Also advised to complete XR that was ordered at OV. Pt said that he had this done yesterday at [facility], showed no fractured.     DEYSI EASTMAN, XR results in care everywhere.

## 2023-10-27 NOTE — TELEPHONE ENCOUNTER
Please have patient compete xray as ordered at ov.    Pain medication will likely not benefit as the pain is intensified by weight bearing.  (Also with hx alcohol use sober for 6 mo)  he can use ES tylenol 1000mg in between the use of naprosyn  up to 4 x daily.

## 2023-10-27 NOTE — TELEPHONE ENCOUNTER
Not sure how and why it was done at [facility].  He will need to obtain a disk with digital copy of xray to take with to podiatry appt.    Hold this encounter to confirm I receive a paper copy of these results as I am concerned it would be missed if patient did not inform us it was completed.

## 2023-10-31 NOTE — TELEPHONE ENCOUNTER
Left detailed message for pt to obtain disk from [facility] with xray results prior to his podiatry appt.

## 2023-11-06 ENCOUNTER — OFFICE VISIT (OUTPATIENT)
Facility: LOCATION | Age: 58
End: 2023-11-06
Payer: COMMERCIAL

## 2023-11-06 VITALS — HEART RATE: 82 BPM | TEMPERATURE: 98 F

## 2023-11-06 DIAGNOSIS — R15.1 FECAL SMEARING: Primary | ICD-10-CM

## 2023-11-06 DIAGNOSIS — K64.1 GRADE II HEMORRHOIDS: ICD-10-CM

## 2023-11-06 NOTE — TELEPHONE ENCOUNTER
Impression    IMPRESSION: No acute radiographic findings. No significant degenerative changes.  Narrative    DATE OF SERVICE: 10.25.2023  EXAM: XR FOOT, COMPLETE (MIN 3 VIEWS), RIGHT (CPT=73630), 10/25/2023 2:00 PM    COMPARISON: None.    HISTORY:  Right foot pain mostly at the heel for one week or more.    FINDINGS: 3 views of the right foot. There is no evidence of acute displaced fracture or  dislocation. Normal osseous mineralization and joint alignment. No radiopaque foreign body. There  are no significant degenerative changes.  Procedure Note    Kelechi Sexton, DO - 10/26/2023  Formatting of this note might be different from the original.  DATE OF SERVICE: 10.25.2023  EXAM: XR FOOT, COMPLETE (MIN 3 VIEWS), RIGHT (CPT=73630), 10/25/2023 2:00 PM    COMPARISON: None.    HISTORY:  Right foot pain mostly at the heel for one week or more.    FINDINGS: 3 views of the right foot. There is no evidence of acute displaced fracture or  dislocation. Normal osseous mineralization and joint alignment. No radiopaque foreign body. There  are no significant degenerative changes.    =====  IMPRESSION: No acute radiographic findings. No significant degenerative changes.      Called and was unable to reach patient to obtain disc for podiatry appointment for images to be reviewed rather than report. Will send report to JOSE Licea sent to patient with disc information.

## 2023-11-06 NOTE — TELEPHONE ENCOUNTER
Pt stated he was told the results can be seen in Care everywhere? He is questioning if he needs another xray based on results?

## 2023-11-08 RX ORDER — NAPROXEN 500 MG/1
500 TABLET ORAL 2 TIMES DAILY WITH MEALS
Qty: 60 TABLET | Refills: 0 | Status: SHIPPED | OUTPATIENT
Start: 2023-11-08 | End: 2023-11-13

## 2023-11-08 NOTE — TELEPHONE ENCOUNTER
Last OV: Heel pain 10/24/23 SD    Future Appointments   Date Time Provider Haley Rubia   11/10/2023  9:30 AM Ruthy Chaudhary DPM ECPLPOGARY EC PLFD   11/13/2023  9:00 AM ELLA [facility]   11/13/2023 11:00 AM Edgardo Baldwin [facility]   11/28/2023 11:40 AM DO SUSHANT Yee [id number]        Latest labs:   CBC, CMP, Lipid, TSH,PSA 06/19/23  Latest RX:   Medication Quantity Refills Start End   naproxen 500 MG Oral Tab 60 tablet 0 10/10/2023    Sig:   Take 1 tablet (500 mg total) by mouth 2 (two) times daily with meals.          Per protocol  Non-protocol

## 2023-11-10 ENCOUNTER — OFFICE VISIT (OUTPATIENT)
Facility: LOCATION | Age: 58
End: 2023-11-10
Payer: COMMERCIAL

## 2023-11-10 DIAGNOSIS — M76.61 ACHILLES TENDINITIS OF RIGHT LOWER EXTREMITY: ICD-10-CM

## 2023-11-10 DIAGNOSIS — M62.461 GASTROCNEMIUS EQUINUS OF RIGHT LOWER EXTREMITY: ICD-10-CM

## 2023-11-10 DIAGNOSIS — M72.2 PLANTAR FASCIITIS OF RIGHT FOOT: Primary | ICD-10-CM

## 2023-11-10 DIAGNOSIS — M79.671 PAIN OF RIGHT HEEL: ICD-10-CM

## 2023-11-10 PROCEDURE — 99203 OFFICE O/P NEW LOW 30 MIN: CPT | Performed by: PODIATRIST

## 2023-11-10 RX ORDER — PREDNISONE 10 MG/1
10 TABLET ORAL 2 TIMES DAILY
Qty: 28 TABLET | Refills: 0 | Status: SHIPPED | OUTPATIENT
Start: 2023-11-10 | End: 2023-11-24
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
## 2023-11-28 ENCOUNTER — OFFICE VISIT (OUTPATIENT)
Dept: NEUROLOGY | Facility: CLINIC | Age: 58
End: 2023-11-28
Payer: COMMERCIAL

## 2023-11-28 VITALS
RESPIRATION RATE: 15 BRPM | BODY MASS INDEX: 28 KG/M2 | DIASTOLIC BLOOD PRESSURE: 72 MMHG | SYSTOLIC BLOOD PRESSURE: 122 MMHG | WEIGHT: 198 LBS | HEART RATE: 84 BPM

## 2023-11-28 DIAGNOSIS — G25.0 ESSENTIAL TREMOR: Primary | ICD-10-CM

## 2023-11-28 PROCEDURE — 99213 OFFICE O/P EST LOW 20 MIN: CPT | Performed by: OTHER

## 2023-11-28 PROCEDURE — 3074F SYST BP LT 130 MM HG: CPT | Performed by: OTHER

## 2023-11-28 PROCEDURE — 3078F DIAST BP <80 MM HG: CPT | Performed by: OTHER

## 2023-11-28 RX ORDER — PRIMIDONE 50 MG/1
150 TABLET ORAL NIGHTLY
Qty: 270 TABLET | Refills: 1 | Status: SHIPPED | OUTPATIENT
Start: 2023-11-28

## 2023-11-28 NOTE — PROGRESS NOTES
Pt reports tremors have improved. Pt reports that on occasion he needs to shake out is arm to \"readjust\" due to tremors.
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
## 2023-12-05 ENCOUNTER — OFFICE VISIT (OUTPATIENT)
Facility: LOCATION | Age: 58
End: 2023-12-05
Payer: COMMERCIAL

## 2023-12-05 DIAGNOSIS — M76.61 ACHILLES TENDINITIS OF RIGHT LOWER EXTREMITY: ICD-10-CM

## 2023-12-05 DIAGNOSIS — M72.2 PLANTAR FASCIITIS OF RIGHT FOOT: Primary | ICD-10-CM

## 2023-12-05 DIAGNOSIS — M62.461 GASTROCNEMIUS EQUINUS OF RIGHT LOWER EXTREMITY: ICD-10-CM

## 2023-12-05 DIAGNOSIS — M79.671 PAIN OF RIGHT HEEL: ICD-10-CM

## 2023-12-05 PROCEDURE — 20550 NJX 1 TENDON SHEATH/LIGAMENT: CPT | Performed by: PODIATRIST
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
## 2023-12-05 NOTE — PROCEDURES
Per Dr Yared Stout, draw up 1ml Marcaine, 1ml [id number] and 1ml Dexamethasone for right foot cortisone injection.

## 2023-12-06 RX ORDER — NAPROXEN 500 MG/1
500 TABLET ORAL 2 TIMES DAILY WITH MEALS
Qty: 60 TABLET | Refills: 0 | OUTPATIENT
Start: 2023-12-06

## 2023-12-06 RX ORDER — DEXAMETHASONE SODIUM PHOSPHATE 10 MG/ML
10 INJECTION INTRAMUSCULAR; INTRAVENOUS ONCE
Status: SHIPPED | OUTPATIENT
Start: 2023-12-06

## 2024-01-19 ENCOUNTER — OFFICE VISIT (OUTPATIENT)
Facility: LOCATION | Age: 59
End: 2024-01-19

## 2024-01-19 DIAGNOSIS — M79.671 PAIN OF RIGHT HEEL: ICD-10-CM

## 2024-01-19 DIAGNOSIS — M62.461 GASTROCNEMIUS EQUINUS OF RIGHT LOWER EXTREMITY: ICD-10-CM

## 2024-01-19 DIAGNOSIS — M72.2 PLANTAR FASCIITIS OF RIGHT FOOT: Primary | ICD-10-CM

## 2024-01-19 DIAGNOSIS — M76.61 ACHILLES TENDINITIS OF RIGHT LOWER EXTREMITY: ICD-10-CM

## 2024-01-19 PROCEDURE — 99213 OFFICE O/P EST LOW 20 MIN: CPT | Performed by: PODIATRIST
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
## 2024-02-17 NOTE — TELEPHONE ENCOUNTER
Continue home citalopram, Depakote, Imitrex and naproxen as needed, also as needed acetaminophen  Suspected exacerbation agreement with the current infection/sinusitis, see above  Denies any neurological deficit and neurological exam unremarkable on admission   Patient is due for his Colonoscopy and would like an order/referral placed for this.

## 2024-03-04 ENCOUNTER — TELEPHONE (OUTPATIENT)
Dept: INTERNAL MEDICINE CLINIC | Facility: CLINIC | Age: 59
End: 2024-03-04

## 2024-03-04 NOTE — TELEPHONE ENCOUNTER
Spoke with patient who reports increased in frequency of urination over last month. Last few weeks has felt lower right back pain with movement. Denies blood in urine, pain with urination, fever, body aches, chills. 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 Discussed if symptoms worsen pain with urination, chills, fever, dark urine, etc to be seen in [facility]. Patient voiced understanding and will continue as scheduled.

## 2024-03-04 NOTE — TELEPHONE ENCOUNTER
Pt made the below appt through [facility].  My need to get in sooner.  No spots with SD    frequency, pain in low back x1 month     Appointment For: Dex Jack ([id number])   Visit Type: EXAM - ESTABLISHED (2844)      3/18/2024    2:30 PM  30 mins.  Betzy Sparks              [facility] [address]      Patient Comments:

## 2024-03-08 ENCOUNTER — TELEPHONE (OUTPATIENT)
Dept: INTERNAL MEDICINE CLINIC | Facility: CLINIC | Age: 59
End: 2024-03-08

## 2024-03-08 DIAGNOSIS — Z00.00 ROUTINE GENERAL MEDICAL EXAMINATION AT A HEALTH CARE FACILITY: Primary | ICD-10-CM

## 2024-03-08 DIAGNOSIS — Z13.228 SCREENING FOR METABOLIC DISORDER: ICD-10-CM

## 2024-03-08 DIAGNOSIS — Z13.0 SCREENING FOR DISORDER OF BLOOD AND BLOOD-FORMING ORGANS: ICD-10-CM

## 2024-03-08 DIAGNOSIS — Z13.220 SCREENING FOR LIPID DISORDERS: ICD-10-CM

## 2024-03-08 DIAGNOSIS — Z13.29 SCREENING FOR THYROID DISORDER: ICD-10-CM

## 2024-03-08 DIAGNOSIS — Z12.5 SCREENING FOR MALIGNANT NEOPLASM OF PROSTATE: ICD-10-CM

## 2024-03-08 NOTE — TELEPHONE ENCOUNTER
Your Appointments      Monday March 18, 2024 10:00 AM  Follow up with ELLA Mason  [facility] ([facility]) [address]  [facility] [address]  [phone]        Monday March 18, 2024  1:00 PM  [facility] [address]  [facility] [id number] [phone]        Monday March 18, 2024  2:30 PM  Exam - Established with ELLA Morales  [facility], [address] [place] [address] ([facility] [place]) [address]  [facility] [address] [phone]        Monday July 08, 2024 10:30 AM  Physical - Established with Betzy Sparks APRSALLY  [facility], [address] [place] [address] ([facility] [place]) [address]  [facility] [address] [phone]             Labs placed

## 2024-03-18 ENCOUNTER — OFFICE VISIT (OUTPATIENT)
Dept: INTERNAL MEDICINE CLINIC | Facility: CLINIC | Age: 59
End: 2024-03-18
Payer: COMMERCIAL

## 2024-03-18 VITALS
WEIGHT: 204 LBS | BODY MASS INDEX: 29.53 KG/M2 | SYSTOLIC BLOOD PRESSURE: 130 MMHG | HEART RATE: 70 BPM | RESPIRATION RATE: 14 BRPM | TEMPERATURE: 97 F | OXYGEN SATURATION: 97 % | HEIGHT: 69.5 IN | DIASTOLIC BLOOD PRESSURE: 76 MMHG

## 2024-03-18 DIAGNOSIS — M79.10 MYALGIA: ICD-10-CM

## 2024-03-18 DIAGNOSIS — R35.0 URINARY FREQUENCY: Primary | ICD-10-CM

## 2024-03-18 DIAGNOSIS — M25.50 ARTHRALGIA, UNSPECIFIED JOINT: ICD-10-CM

## 2024-03-18 DIAGNOSIS — I10 ESSENTIAL HYPERTENSION: ICD-10-CM

## 2024-03-18 DIAGNOSIS — M54.50 ACUTE LOW BACK PAIN WITHOUT SCIATICA, UNSPECIFIED BACK PAIN LATERALITY: ICD-10-CM

## 2024-03-18 LAB
BILIRUB UR QL STRIP.AUTO: NEGATIVE
CLARITY UR REFRACT.AUTO: CLEAR
GLUCOSE UR STRIP.AUTO-MCNC: NORMAL MG/DL
KETONES UR STRIP.AUTO-MCNC: NEGATIVE MG/DL
LEUKOCYTE ESTERASE UR QL STRIP.AUTO: NEGATIVE
NITRITE UR QL STRIP.AUTO: NEGATIVE
PH UR STRIP.AUTO: 7 [PH] (ref 5–8)
PROT UR STRIP.AUTO-MCNC: NEGATIVE MG/DL
RBC UR QL AUTO: NEGATIVE
SP GR UR STRIP.AUTO: 1.01 (ref 1–1.03)
UROBILINOGEN UR STRIP.AUTO-MCNC: NORMAL MG/DL

## 2024-03-18 PROCEDURE — 81003 URINALYSIS AUTO W/O SCOPE: CPT | Performed by: NURSE PRACTITIONER
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
## 2024-03-21 LAB
ABSOLUTE BASOPHILS: 90 CELLS/UL (ref 0–200)
ABSOLUTE EOSINOPHILS: 402 CELLS/UL (ref 15–500)
ABSOLUTE LYMPHOCYTES: 1738 CELLS/UL (ref 850–3900)
ABSOLUTE MONOCYTES: 541 CELLS/UL (ref 200–950)
ABSOLUTE NEUTROPHILS: 5428 CELLS/UL (ref 1500–7800)
ALBUMIN/GLOBULIN RATIO: 1.8 (CALC) (ref 1–2.5)
ALBUMIN: 4.6 G/DL (ref 3.6–5.1)
ALKALINE PHOSPHATASE: 92 U/L (ref 35–144)
ALT: 23 U/L (ref 9–46)
AST: 18 U/L (ref 10–35)
BASOPHILS: 1.1 %
BILIRUBIN, TOTAL: 0.4 MG/DL (ref 0.2–1.2)
BUN: 16 MG/DL (ref 7–25)
CALCIUM: 9.8 MG/DL (ref 8.6–10.3)
CARBON DIOXIDE: 27 MMOL/L (ref 20–32)
CHLORIDE: 103 MMOL/L (ref 98–110)
CHOL/HDLC RATIO: 4 (CALC)
CHOLESTEROL, TOTAL: 187 MG/DL
CREATININE: 0.95 MG/DL (ref 0.7–1.35)
EGFR: 92 ML/MIN/1.73M2
EOSINOPHILS: 4.9 %
GLOBULIN: 2.6 G/DL (CALC) (ref 1.9–3.7)
GLUCOSE: 96 MG/DL (ref 65–99)
HDL CHOLESTEROL: 47 MG/DL
HEMATOCRIT: 49.5 % (ref 38.5–50)
HEMOGLOBIN: 16.3 G/DL (ref 13.2–17.1)
LDL-CHOLESTEROL: 112 MG/DL (CALC)
LYMPHOCYTES: 21.2 %
MCH: 28.6 PG (ref 27–33)
MCHC: 32.9 G/DL (ref 32–36)
MCV: 86.8 FL (ref 80–100)
MONOCYTES: 6.6 %
MPV: 9.6 FL (ref 7.5–12.5)
NEUTROPHILS: 66.2 %
NON-HDL CHOLESTEROL: 140 MG/DL (CALC)
PLATELET COUNT: 307 THOUSAND/UL (ref 140–400)
POTASSIUM: 4.3 MMOL/L (ref 3.5–5.3)
PROTEIN, TOTAL: 7.2 G/DL (ref 6.1–8.1)
PSA, TOTAL: 0.85 NG/ML
RDW: 12.8 % (ref 11–15)
RED BLOOD CELL COUNT: 5.7 MILLION/UL (ref 4.2–5.8)
SODIUM: 137 MMOL/L (ref 135–146)
TRIGLYCERIDES: 164 MG/DL
TSH W/REFLEX TO FT4: 2.16 MIU/L (ref 0.4–4.5)
WHITE BLOOD CELL COUNT: 8.2 THOUSAND/UL (ref 3.8–10.8)

## 2024-03-26 DIAGNOSIS — E78.00 PURE HYPERCHOLESTEROLEMIA: Primary | ICD-10-CM

## 2024-03-26 RX ORDER — ATORVASTATIN CALCIUM 10 MG/1
10 TABLET, FILM COATED ORAL
Qty: 36 TABLET | Refills: 0 | Status: SHIPPED | OUTPATIENT
Start: 2024-03-27 | End: 2024-06-25

## 2024-03-28 ENCOUNTER — HOSPITAL ENCOUNTER (OUTPATIENT)
Dept: ULTRASOUND IMAGING | Age: 59
Discharge: HOME | End: 2024-03-28
Attending: NURSE PRACTITIONER
Payer: COMMERCIAL

## 2024-03-28 DIAGNOSIS — M54.50 ACUTE LOW BACK PAIN WITHOUT SCIATICA, UNSPECIFIED BACK PAIN LATERALITY: ICD-10-CM

## 2024-03-28 DIAGNOSIS — R35.0 URINARY FREQUENCY: ICD-10-CM

## 2024-03-28 PROCEDURE — 76770 US EXAM ABDO BACK WALL COMP: CPT | Performed by: NURSE PRACTITIONER

## 2024-04-08 NOTE — TELEPHONE ENCOUNTER
Spoke to pt about his US Kidney/Bladder, notified results are ok except an enlarged prostate.  Due to s/s, pt referred to Dr Cesar [facility] or partner, info given to call to schedule an appt for evaluation.  Pt verbalizes understanding.

## 2024-05-10 ENCOUNTER — NURSE TRIAGE (OUTPATIENT)
Dept: INTERNAL MEDICINE CLINIC | Facility: CLINIC | Age: 59
End: 2024-05-10

## 2024-05-10 ENCOUNTER — OFFICE VISIT (OUTPATIENT)
Dept: FAMILY MEDICINE CLINIC | Facility: CLINIC | Age: 59
End: 2024-05-10
Payer: COMMERCIAL

## 2024-05-10 VITALS
HEART RATE: 60 BPM | SYSTOLIC BLOOD PRESSURE: 136 MMHG | WEIGHT: 195 LBS | RESPIRATION RATE: 18 BRPM | TEMPERATURE: 98 F | HEIGHT: 70 IN | BODY MASS INDEX: 27.92 KG/M2 | DIASTOLIC BLOOD PRESSURE: 88 MMHG | OXYGEN SATURATION: 95 %

## 2024-05-10 DIAGNOSIS — H00.011 HORDEOLUM EXTERNUM OF RIGHT UPPER EYELID: Primary | ICD-10-CM

## 2024-05-10 PROCEDURE — 99213 OFFICE O/P EST LOW 20 MIN: CPT | Performed by: FAMILY MEDICINE

## 2024-05-10 RX ORDER — TOBRAMYCIN 3 MG/ML
2 SOLUTION/ DROPS OPHTHALMIC
Qty: 5 ML | Refills: 0 | Status: SHIPPED | OUTPATIENT
Start: 2024-05-10 | End: 2024-05-17

## 2024-05-10 NOTE — TELEPHONE ENCOUNTER
Action Requested: Summary for Provider     []  Critical Lab, Recommendations Needed  [] Need Additional Advice  [x]   FYI    []   Need Orders  [] Need Medications Sent to Pharmacy  []  Other     SUMMARY: Received call from pt. Per pt, he believes he has pink eye. He had some crusting/goo in eye this morning. Eye is also pink/mildly swollen/itchy. No appts available today, advised to be seen in [facility] for eval. Pt stated understanding and agreed to plan.     FYI SD    Reason for call: Sick Call  Onset: Data Unavailable               Reason for Disposition   MODERATE eye pain or discomfort (e.g., interferes with normal activities or awakens from sleep; more than mild)   Patient wants to be seen    Protocols used: Eye - Red Without Pus-A-OH     no concerns
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
## 2024-05-11 NOTE — PATIENT INSTRUCTIONS
Use eye drops-- 2 drops in the right eye every 4 hours until symptoms resolve.  Use warm compresses to the affected eye for 10-15 minutes at a time, several times a day.   Take ibuprofen for discomfort and you may add synthetic tear drops as needed for comfort.   If no better in 1 week, follow-up with PCP for further evaluation.

## 2024-05-20 RX ORDER — LISINOPRIL 10 MG/1
10 TABLET ORAL DAILY
Qty: 90 TABLET | Refills: 3 | Status: SHIPPED | OUTPATIENT
Start: 2024-05-20

## 2024-05-20 NOTE — TELEPHONE ENCOUNTER
Refill Per Protocol     Requested Prescriptions   Pending Prescriptions Disp Refills    LISINOPRIL 10 MG Oral Tab [Pharmacy Med Name: LISINOPRIL 10 MG TABLET] 90 tablet 2     Sig: TAKE 1 TABLET BY MOUTH EVERY DAY       Hypertension Medications Protocol Passed - 5/16/2024 12:36 AM        Passed - CMP or BMP in past 12 months        Passed - Last BP reading less than 140/90     BP Readings from Last 1 Encounters:   05/10/24 136/88               Passed - In person appointment or virtual visit in the past 12 mos or appointment in next 3 mos     Recent Outpatient Visits              1 week ago Hordeolum externum of right upper eyelid    [facility], [facility], [address] [place] [address] Debra Marti PA-C    Office Visit    2 months ago Urinary frequency    [facility], [address] [place] [address] Betzy Sparks APRN    Office Visit    4 months ago Plantar fasciitis of right foot    [facility], [address] [place] Rakesh Orellana, DPM    Office Visit    5 months ago Plantar fasciitis of right foot    [facility], [address] [place] Rakesh Orellana DPM    Office Visit    5 months ago Essential tremor    [facility], [address] [place] [address] Crispin Graham DO    Office Visit          Future Appointments         Provider Department Appt Notes    In 3 weeks Enrique Cesar MD [facility] Enlarged prostate    In 1 month Betzy Sparks APRN [address] cpe  last 7/23                    Passed - EGFRCR or GFRNAA > 50     GFR Evaluation  EGFRCR: 92 , resulted on 3/20/2024                 Future Appointments         Provider Department Appt Notes    In 3 weeks Enrique Cesar MD [facility] Enlarged prostate    In 1 month Betzy Sparks APRN [address] cpe  last 7/23           Recent Outpatient Visits              1 week ago Hordeolum externum of right upper eyelid    [facility], [facility], [address], Debra Rodriguez PA-C    Office Visit    2 months ago Urinary frequency    [facility], [address], Betzy Roth APRN    Office Visit    4 months ago Plantar fasciitis of right foot    [facility], [address], Rakesh Alexander DPM    Office Visit    5 months ago Plantar fasciitis of right foot    [facility], [address], Rakesh Alexander DPM    Office Visit    5 months ago Essential tremor    [facility], [address], Crispin Cummings DO    Office Visit

## 2024-05-24 DIAGNOSIS — G25.0 ESSENTIAL TREMOR: ICD-10-CM

## 2024-05-24 NOTE — TELEPHONE ENCOUNTER
LMTCB - per LOV pt to taper to 100 mg nightly. Refill request for 150 mg nightly. Pt also due for appt.     Medication: PRIMIDONE 50 MG      Date of last refill: 11/28/2023 (#270/1)  [place]      Last office visit: 11/28/2023  Due back to clinic per last office note:  4 months  Date next office visit scheduled:    Future Appointments   Date Time Provider Department Center   6/14/2024 11:00 AM Enrique Cesar MD [id number]   6/17/2024 11:00 AM Dawna Hernandez LCPC [facility]   6/17/2024  2:00 PM Abby Peralta APRN [facility] [place]   7/8/2024 10:30 AM Betzy Sparks APRN [facility] [address] [facility] [address]           Last OV note recommendation:    1. Tremor -essential tremor with an anxiety component?  - Mysoline        - Decrease to 100mg nightly for 2-4 weeks and if needed we can taper further. If symptoms increase we can go back to 150mg nightly

## 2024-05-31 NOTE — TELEPHONE ENCOUNTER
Message left on voice mail (ok per HIPAA consent) that he needs to schedule a follow up appointment before refill can be processed.

## 2024-05-31 NOTE — TELEPHONE ENCOUNTER
Pt made f/u appt for Sept, pt unable to accept 2 openings next week in [place] due to work schedule.

## 2024-05-31 NOTE — TELEPHONE ENCOUNTER
Left 2nd message requesting dosage verification.  Called pharmacy and informed staff that we have been attempting to reach patient to discuss dosage.

## 2024-06-03 RX ORDER — PRIMIDONE 50 MG/1
100 TABLET ORAL NIGHTLY
Qty: 180 TABLET | Refills: 0 | Status: SHIPPED | OUTPATIENT
Start: 2024-06-03

## 2024-06-14 ENCOUNTER — OFFICE VISIT (OUTPATIENT)
Dept: SURGERY | Facility: CLINIC | Age: 59
End: 2024-06-14
Payer: COMMERCIAL

## 2024-06-14 DIAGNOSIS — R82.90 URINE FINDING: ICD-10-CM

## 2024-06-14 DIAGNOSIS — N40.1 BPH WITH OBSTRUCTION/LOWER URINARY TRACT SYMPTOMS: Primary | ICD-10-CM

## 2024-06-14 DIAGNOSIS — N13.8 BPH WITH OBSTRUCTION/LOWER URINARY TRACT SYMPTOMS: Primary | ICD-10-CM

## 2024-06-14 LAB
APPEARANCE: CLEAR
GLUCOSE (URINE DIPSTICK): NEGATIVE MG/DL
LEUKOCYTES: NEGATIVE
MULTISTIX LOT#: ABNORMAL NUMERIC
NITRITE, URINE: NEGATIVE
PH, URINE: 5.5 (ref 4.5–8)
PROTEIN (URINE DIPSTICK): NEGATIVE MG/DL
SPECIFIC GRAVITY: 1.03 (ref 1–1.03)
URINE-COLOR: YELLOW
UROBILINOGEN,SEMI-QN: 0.2 MG/DL (ref 0–1.9)

## 2024-06-14 PROCEDURE — 51798 US URINE CAPACITY MEASURE: CPT | Performed by: SURGERY

## 2024-06-14 PROCEDURE — 99204 OFFICE O/P NEW MOD 45 MIN: CPT | Performed by: SURGERY

## 2024-06-14 PROCEDURE — 81003 URINALYSIS AUTO W/O SCOPE: CPT | Performed by: SURGERY

## 2024-06-14 RX ORDER — TAMSULOSIN HYDROCHLORIDE 0.4 MG/1
0.4 CAPSULE ORAL EVERY EVENING
Qty: 90 CAPSULE | Refills: 6 | Status: SHIPPED | OUTPATIENT
Start: 2024-06-14
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
## 2024-06-15 RX ORDER — ATORVASTATIN CALCIUM 10 MG/1
10 TABLET, FILM COATED ORAL
Qty: 36 TABLET | Refills: 0 | Status: SHIPPED | OUTPATIENT
Start: 2024-06-17

## 2024-06-15 NOTE — TELEPHONE ENCOUNTER
Refill passed per [facility] protocol.    3 month given-patient due for repeat labs on 06/26/2024.    Requested Prescriptions   Pending Prescriptions Disp Refills    ATORVASTATIN 10 MG Oral Tab [Pharmacy Med Name: ATORVASTATIN 10 MG TABLET] 36 tablet 0     Sig: TAKE 1 TABLET BY MOUTH 3 TIMES A WEEK.       Cholesterol Medication Protocol Passed - 6/12/2024  9:44 PM        Passed - ALT < 80     Lab Results   Component Value Date    ALT 23 03/20/2024             Passed - ALT resulted within past year        Passed - Lipid panel within past 12 months     Lab Results   Component Value Date    CHOLEST 187 03/20/2024    TRIG 164 (H) 03/20/2024    HDL 47 03/20/2024     (H) 03/20/2024    VLDL 35 (H) 08/17/2020    TCHDLRATIO 4.0 03/20/2024    NONHDLC 140 (H) 03/20/2024             Passed - In person appointment or virtual visit in the past 12 mos or appointment in next 3 mos     Recent Outpatient Visits              Yesterday BPH with obstruction/lower urinary tract symptoms    [facility], [facility] Enrique Cesar MD    Office Visit    1 month ago Hordeolum externum of right upper eyelid    [facility], [facility], [address] [place] [address] Debra Marti PA-C    Office Visit    2 months ago Urinary frequency    [facility], [address] [place] [address] Betzy Spraks APRN    Office Visit    4 months ago Plantar fasciitis of right foot    [facility], [address], Rakesh Alexander, ANTOINETTE    Office Visit    6 months ago Plantar fasciitis of right foot    [facility], [address], Rakesh Alexander, DPM    Office Visit          Future Appointments         Provider Department Appt Notes    In 3 weeks Betzy Sparks APRN [address] cpe  last 7/23    In 2 months Enrique Cesar MD [facility] 3 month follow up    In 3  months Crispin Graham DO [facility], [address] [place] [address] [facility]                       Future Appointments         Provider Department Appt Notes    In 3 weeks Betzy Sparks APRN [facility], [address] [place] [address] cpe  last 7/23    In 2 months Enrique Cesar MD [facility], [facility] 3 month follow up    In 3 months Crispin Graham DO [facility], [address] [place] [address] [facility]          Recent Outpatient Visits              Yesterday BPH with obstruction/lower urinary tract symptoms    [facility], [facility] Enrique Cesar MD    Office Visit    1 month ago Hordeolum externum of right upper eyelid    [facility], [facility], [address] [place] [address] Debra Marti PA-C    Office Visit    2 months ago Urinary frequency    [facility], [address] [place] [address] Betzy Sparks APRN    Office Visit    4 months ago Plantar fasciitis of right foot    [facility], [address], Rakesh Alexander DPM    Office Visit    6 months ago Plantar fasciitis of right foot    [facility], [address], Rakesh Alexander DPM    Office Visit

## 2024-06-25 ENCOUNTER — TELEPHONE (OUTPATIENT)
Dept: SURGERY | Facility: CLINIC | Age: 59
End: 2024-06-25

## 2024-06-25 NOTE — TELEPHONE ENCOUNTER
This encounter is now closed.     RTW redo.   Printed copy. On .   RN called patient and updated. He agreed to  copy at office

## 2024-06-25 NOTE — TELEPHONE ENCOUNTER
Patient requesting note for time off from June 12-14, and cleared to return back to work on 6/15, to please include physician signature. Please call when ready to  at [phone], ok to leave message on voicemail, thanks.

## 2024-06-27 ENCOUNTER — TELEPHONE (OUTPATIENT)
Dept: INTERNAL MEDICINE CLINIC | Facility: CLINIC | Age: 59
End: 2024-06-27

## 2024-06-27 NOTE — TELEPHONE ENCOUNTER
Future Appointments   Date Time Provider Department Center   7/18/2024 10:00 AM Betzy Sparks APRN [facility]     Informed must fast no call back required. Orders to [facility]

## 2024-07-06 LAB
ALBUMIN/GLOBULIN RATIO: 1.9 (CALC) (ref 1–2.5)
ALBUMIN: 4.6 G/DL (ref 3.6–5.1)
ALKALINE PHOSPHATASE: 97 U/L (ref 35–144)
ALT: 20 U/L (ref 9–46)
AST: 26 U/L (ref 10–35)
BILIRUBIN, TOTAL: 0.4 MG/DL (ref 0.2–1.2)
BUN: 16 MG/DL (ref 7–25)
CALCIUM: 9.2 MG/DL (ref 8.6–10.3)
CARBON DIOXIDE: 22 MMOL/L (ref 20–32)
CHLORIDE: 108 MMOL/L (ref 98–110)
CHOL/HDLC RATIO: 2.7 (CALC)
CHOLESTEROL, TOTAL: 224 MG/DL
CREATININE: 0.86 MG/DL (ref 0.7–1.35)
EGFR: 99 ML/MIN/1.73M2
GLOBULIN: 2.4 G/DL (CALC) (ref 1.9–3.7)
GLUCOSE: 92 MG/DL (ref 65–99)
HDL CHOLESTEROL: 84 MG/DL
LDL-CHOLESTEROL: 114 MG/DL (CALC)
NON-HDL CHOLESTEROL: 140 MG/DL (CALC)
POTASSIUM: 4.4 MMOL/L (ref 3.5–5.3)
PROTEIN, TOTAL: 7 G/DL (ref 6.1–8.1)
SODIUM: 141 MMOL/L (ref 135–146)
TRIGLYCERIDES: 143 MG/DL
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
## 2024-07-18 ENCOUNTER — OFFICE VISIT (OUTPATIENT)
Dept: INTERNAL MEDICINE CLINIC | Facility: CLINIC | Age: 59
End: 2024-07-18
Payer: COMMERCIAL

## 2024-07-18 VITALS
WEIGHT: 198 LBS | SYSTOLIC BLOOD PRESSURE: 138 MMHG | HEIGHT: 70 IN | TEMPERATURE: 99 F | HEART RATE: 76 BPM | BODY MASS INDEX: 28.35 KG/M2 | OXYGEN SATURATION: 96 % | DIASTOLIC BLOOD PRESSURE: 82 MMHG

## 2024-07-18 DIAGNOSIS — E78.00 PURE HYPERCHOLESTEROLEMIA: ICD-10-CM

## 2024-07-18 DIAGNOSIS — Z98.890 HISTORY OF LUMBAR LAMINECTOMY FOR SPINAL CORD DECOMPRESSION: ICD-10-CM

## 2024-07-18 DIAGNOSIS — R73.9 HYPERGLYCEMIA: ICD-10-CM

## 2024-07-18 DIAGNOSIS — F41.9 ANXIETY: ICD-10-CM

## 2024-07-18 DIAGNOSIS — G89.29 CHRONIC KNEE PAIN, UNSPECIFIED LATERALITY: ICD-10-CM

## 2024-07-18 DIAGNOSIS — Z00.00 ROUTINE GENERAL MEDICAL EXAMINATION AT A HEALTH CARE FACILITY: Primary | ICD-10-CM

## 2024-07-18 DIAGNOSIS — M25.569 CHRONIC KNEE PAIN, UNSPECIFIED LATERALITY: ICD-10-CM

## 2024-07-18 DIAGNOSIS — I10 ESSENTIAL HYPERTENSION: ICD-10-CM

## 2024-07-18 DIAGNOSIS — G25.0 ESSENTIAL TREMOR: ICD-10-CM

## 2024-07-18 PROCEDURE — 99396 PREV VISIT EST AGE 40-64: CPT | Performed by: NURSE PRACTITIONER

## 2024-07-18 RX ORDER — ATORVASTATIN CALCIUM 10 MG/1
10 TABLET, FILM COATED ORAL
Qty: 36 TABLET | Refills: 3 | Status: SHIPPED | OUTPATIENT
Start: 2024-07-19

## 2024-07-18 RX ORDER — LISINOPRIL 20 MG/1
20 TABLET ORAL DAILY
Qty: 90 TABLET | Refills: 3 | Status: SHIPPED | OUTPATIENT
Start: 2024-07-18

## 2024-07-18 NOTE — PATIENT INSTRUCTIONS
2 aleve tabs twice daily for a1-2 weeks.    Continue to brace.      OMRON blood pressure cuff   $40  basic model.

## 2024-08-22 ENCOUNTER — OFFICE VISIT (OUTPATIENT)
Dept: INTERNAL MEDICINE CLINIC | Facility: CLINIC | Age: 59
End: 2024-08-22
Payer: COMMERCIAL

## 2024-08-22 ENCOUNTER — HOSPITAL ENCOUNTER (OUTPATIENT)
Dept: GENERAL RADIOLOGY | Age: 59
Discharge: HOME | End: 2024-08-22
Attending: NURSE PRACTITIONER
Payer: COMMERCIAL

## 2024-08-22 VITALS
RESPIRATION RATE: 22 BRPM | HEART RATE: 75 BPM | WEIGHT: 205.19 LBS | BODY MASS INDEX: 29 KG/M2 | DIASTOLIC BLOOD PRESSURE: 88 MMHG | TEMPERATURE: 98 F | SYSTOLIC BLOOD PRESSURE: 142 MMHG | OXYGEN SATURATION: 95 %

## 2024-08-22 DIAGNOSIS — M25.561 CHRONIC PAIN OF RIGHT KNEE: Primary | ICD-10-CM

## 2024-08-22 DIAGNOSIS — G89.29 CHRONIC PAIN OF RIGHT KNEE: Primary | ICD-10-CM

## 2024-08-22 DIAGNOSIS — M25.561 CHRONIC PAIN OF RIGHT KNEE: ICD-10-CM

## 2024-08-22 DIAGNOSIS — G89.29 CHRONIC PAIN OF RIGHT KNEE: ICD-10-CM

## 2024-08-22 DIAGNOSIS — R09.81 SINUS CONGESTION: ICD-10-CM

## 2024-08-22 DIAGNOSIS — I10 ESSENTIAL HYPERTENSION: ICD-10-CM

## 2024-08-22 PROCEDURE — 73562 X-RAY EXAM OF KNEE 3: CPT | Performed by: NURSE PRACTITIONER

## 2024-08-22 PROCEDURE — 99214 OFFICE O/P EST MOD 30 MIN: CPT | Performed by: NURSE PRACTITIONER

## 2024-08-22 RX ORDER — PREDNISONE 10 MG/1
10 TABLET ORAL DAILY
Qty: 10 TABLET | Refills: 0 | Status: SHIPPED | OUTPATIENT
Start: 2024-08-22

## 2024-08-22 RX ORDER — AMLODIPINE BESYLATE 2.5 MG/1
2.5 TABLET ORAL DAILY
Qty: 90 TABLET | Refills: 0 | Status: SHIPPED | OUTPATIENT
Start: 2024-08-22 | End: 2024-08-26

## 2024-08-22 RX ORDER — LISINOPRIL 10 MG/1
TABLET ORAL
COMMUNITY
Start: 2024-08-14 | End: 2024-08-22
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
## 2024-08-26 ENCOUNTER — TELEPHONE (OUTPATIENT)
Dept: INTERNAL MEDICINE CLINIC | Facility: CLINIC | Age: 59
End: 2024-08-26

## 2024-08-26 NOTE — TELEPHONE ENCOUNTER
OV note from 7/18 states to increase lisinopril to 20 mg. Patient was also seen on 8/22, amlodipine was added.     Nicky Sparks, to confirm, pt to continue on lisinopril 20 mg?

## 2024-08-26 NOTE — TELEPHONE ENCOUNTER
Called and spoke to pt. Per pt, he thought he was taking lisinopril 20 mg, but has only been taking 10 mg. Advised to increase to 20 mg and hold off on adding amlodipine. Send update with readings on lisinopril 20 mg. Patient stated understanding and agreed to plan.     Patient also wanted Nicky Sparks to be aware he has noticed he has had a decrease in appetite. He forgot to mention at LOV. Advised to follow-up if this continues.    DEYSI Sparks

## 2024-08-26 NOTE — TELEPHONE ENCOUNTER
Patient called requesting us to call him back to confirm what dose of lisinopril he is supposed to be taking 10 or 20mg?

## 2024-08-26 NOTE — TELEPHONE ENCOUNTER
Lisinopril was increased (or suppose to be) to 20mg at July visit.  If he has not been taking 20mg, can increase this first and send readings to me before adding amlodipine.  (Started most recent ov as bp was still high)

## 2024-09-02 DIAGNOSIS — G25.0 ESSENTIAL TREMOR: ICD-10-CM

## 2024-09-03 RX ORDER — PRIMIDONE 50 MG/1
100 TABLET ORAL NIGHTLY
Qty: 180 TABLET | Refills: 0 | Status: SHIPPED | OUTPATIENT
Start: 2024-09-03

## 2024-09-03 NOTE — TELEPHONE ENCOUNTER
Medication: PRIMIDONE 50 MG Oral Tab      Date of last refill: 06/03/2024 (#180/0)  [place]/A     Last office visit: 11/28/2023  Due back to clinic per last office note:  Around 03/28/2024  Date next office visit scheduled:    Future Appointments   Date Time Provider Department Center   9/6/2024 10:45 AM Enrique Cesar MD [id number]   9/26/2024 11:00 AM Dawna Hernandez LCPC [facility]   9/26/2024  1:30 PM Abby Peralta APRN [facility]   9/27/2024 10:40 AM Crispin Graham DO [id number]           Last OV note recommendation:    Assessment:  This is a 57 y/o male with essential tremor. He has no tremors at all today.          Plan:  1. Tremor -essential tremor with an anxiety component?  - Mysoline        - Decrease to 100mg nightly for 2-4 weeks and if needed we can taper further. If symptoms increase we can go back to 150mg nightly              Crispin Graham DO  Neurology

## 2024-09-06 ENCOUNTER — OFFICE VISIT (OUTPATIENT)
Dept: SURGERY | Facility: CLINIC | Age: 59
End: 2024-09-06
Payer: COMMERCIAL

## 2024-09-06 DIAGNOSIS — N40.1 BPH WITH OBSTRUCTION/LOWER URINARY TRACT SYMPTOMS: Primary | ICD-10-CM

## 2024-09-06 DIAGNOSIS — N13.8 BPH WITH OBSTRUCTION/LOWER URINARY TRACT SYMPTOMS: Primary | ICD-10-CM

## 2024-09-06 LAB
APPEARANCE: CLEAR
BILIRUBIN: NEGATIVE
GLUCOSE (URINE DIPSTICK): NEGATIVE MG/DL
KETONES (URINE DIPSTICK): NEGATIVE MG/DL
LEUKOCYTES: NEGATIVE
MULTISTIX LOT#: ABNORMAL NUMERIC
NITRITE, URINE: NEGATIVE
PH, URINE: 5.5 (ref 4.5–8)
PROTEIN (URINE DIPSTICK): NEGATIVE MG/DL
SPECIFIC GRAVITY: 1.01 (ref 1–1.03)
URINE-COLOR: YELLOW
UROBILINOGEN,SEMI-QN: 0.2 MG/DL (ref 0–1.9)

## 2024-09-06 PROCEDURE — 81003 URINALYSIS AUTO W/O SCOPE: CPT | Performed by: SURGERY

## 2024-09-06 PROCEDURE — 99213 OFFICE O/P EST LOW 20 MIN: CPT | Performed by: SURGERY

## 2024-09-06 RX ORDER — TAMSULOSIN HYDROCHLORIDE 0.4 MG/1
0.8 CAPSULE ORAL EVERY EVENING
Qty: 180 CAPSULE | Refills: 6 | Status: SHIPPED | OUTPATIENT
Start: 2024-09-06
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
## 2024-09-13 ENCOUNTER — TELEPHONE (OUTPATIENT)
Dept: INTERNAL MEDICINE CLINIC | Facility: CLINIC | Age: 59
End: 2024-09-13

## 2024-09-13 DIAGNOSIS — M25.561 CHRONIC PAIN OF RIGHT KNEE: Primary | ICD-10-CM

## 2024-09-13 DIAGNOSIS — G89.29 CHRONIC PAIN OF RIGHT KNEE: Primary | ICD-10-CM

## 2024-09-13 NOTE — TELEPHONE ENCOUNTER
LOV 8/22/24    Pt called. Stated he was originally referred to [facility] for physical therapy and would like to go to [facility] instead.     [facility]   [address] [place] [address]   Phone#: [phone]  Fax#: [phone]    SD - Pended referral if agreeable

## 2024-09-13 NOTE — TELEPHONE ENCOUNTER
Order placed.  Should be able to take other order to any facility?  He can obtain this order from my chart if needed.

## 2024-09-16 NOTE — TELEPHONE ENCOUNTER
Pt called, stated he was seen at [facility] and was unhappy with the care he received there.  Asking if his PT referral can return to [facility] as originally ordered.  Referral to [facility] pended, please submit if OK.    [facility]  [address],   [place], [place]  F#: [phone]

## 2024-09-19 ENCOUNTER — MED REC SCAN ONLY (OUTPATIENT)
Dept: INTERNAL MEDICINE CLINIC | Facility: CLINIC | Age: 59
End: 2024-09-19

## 2024-09-19 ENCOUNTER — OFFICE VISIT (OUTPATIENT)
Dept: INTERNAL MEDICINE CLINIC | Facility: CLINIC | Age: 59
End: 2024-09-19
Payer: COMMERCIAL

## 2024-09-19 VITALS
SYSTOLIC BLOOD PRESSURE: 132 MMHG | OXYGEN SATURATION: 95 % | DIASTOLIC BLOOD PRESSURE: 78 MMHG | TEMPERATURE: 98 F | WEIGHT: 204 LBS | HEART RATE: 103 BPM | BODY MASS INDEX: 29 KG/M2

## 2024-09-19 DIAGNOSIS — N40.0 BENIGN PROSTATIC HYPERPLASIA WITHOUT LOWER URINARY TRACT SYMPTOMS: ICD-10-CM

## 2024-09-19 DIAGNOSIS — F41.9 ANXIETY: ICD-10-CM

## 2024-09-19 DIAGNOSIS — I10 ESSENTIAL HYPERTENSION: Primary | ICD-10-CM

## 2024-09-19 PROCEDURE — 99214 OFFICE O/P EST MOD 30 MIN: CPT | Performed by: NURSE PRACTITIONER

## 2024-09-19 RX ORDER — OLMESARTAN MEDOXOMIL 40 MG/1
40 TABLET ORAL DAILY
Qty: 90 TABLET | Refills: 1 | Status: SHIPPED | OUTPATIENT
Start: 2024-09-19
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
## 2024-09-27 ENCOUNTER — OFFICE VISIT (OUTPATIENT)
Dept: NEUROLOGY | Facility: CLINIC | Age: 59
End: 2024-09-27
Payer: COMMERCIAL

## 2024-09-27 VITALS
BODY MASS INDEX: 29.2 KG/M2 | OXYGEN SATURATION: 96 % | DIASTOLIC BLOOD PRESSURE: 98 MMHG | WEIGHT: 204 LBS | RESPIRATION RATE: 16 BRPM | SYSTOLIC BLOOD PRESSURE: 150 MMHG | HEART RATE: 75 BPM | HEIGHT: 70 IN

## 2024-09-27 DIAGNOSIS — G25.0 ESSENTIAL TREMOR: Primary | ICD-10-CM

## 2024-09-27 PROCEDURE — 99213 OFFICE O/P EST LOW 20 MIN: CPT | Performed by: OTHER

## 2024-09-27 RX ORDER — PRIMIDONE 50 MG/1
TABLET ORAL
Qty: 270 TABLET | Refills: 1 | Status: SHIPPED | OUTPATIENT
Start: 2024-09-27
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
## 2024-10-21 ENCOUNTER — OFFICE VISIT (OUTPATIENT)
Facility: LOCATION | Age: 59
End: 2024-10-21
Payer: COMMERCIAL

## 2024-10-21 DIAGNOSIS — J34.2 NASAL SEPTAL DEVIATION: Primary | ICD-10-CM

## 2024-10-21 PROCEDURE — 99203 OFFICE O/P NEW LOW 30 MIN: CPT | Performed by: STUDENT IN AN ORGANIZED HEALTH CARE EDUCATION/TRAINING PROGRAM

## 2024-10-21 PROCEDURE — 31231 NASAL ENDOSCOPY DX: CPT | Performed by: STUDENT IN AN ORGANIZED HEALTH CARE EDUCATION/TRAINING PROGRAM

## 2024-11-21 RX ORDER — AMLODIPINE BESYLATE 2.5 MG/1
2.5 TABLET ORAL DAILY
Qty: 90 TABLET | Refills: 0 | OUTPATIENT
Start: 2024-11-21

## 2024-12-04 ENCOUNTER — LAB ENCOUNTER (OUTPATIENT)
Dept: LAB | Age: 59
End: 2024-12-04
Attending: INTERNAL MEDICINE
Payer: COMMERCIAL

## 2024-12-04 ENCOUNTER — TELEPHONE (OUTPATIENT)
Facility: LOCATION | Age: 59
End: 2024-12-04

## 2024-12-04 ENCOUNTER — OFFICE VISIT (OUTPATIENT)
Facility: LOCATION | Age: 59
End: 2024-12-04
Payer: COMMERCIAL

## 2024-12-04 DIAGNOSIS — J32.4 CHRONIC PANSINUSITIS: Primary | ICD-10-CM

## 2024-12-04 DIAGNOSIS — Z79.899 MEDICATION MANAGEMENT: ICD-10-CM

## 2024-12-04 LAB — VIT D+METAB SERPL-MCNC: 31.6 NG/ML (ref 30–100)

## 2024-12-04 PROCEDURE — 99212 OFFICE O/P EST SF 10 MIN: CPT | Performed by: STUDENT IN AN ORGANIZED HEALTH CARE EDUCATION/TRAINING PROGRAM

## 2024-12-04 PROCEDURE — 31231 NASAL ENDOSCOPY DX: CPT | Performed by: STUDENT IN AN ORGANIZED HEALTH CARE EDUCATION/TRAINING PROGRAM

## 2024-12-04 PROCEDURE — 36415 COLL VENOUS BLD VENIPUNCTURE: CPT

## 2024-12-04 PROCEDURE — 82306 VITAMIN D 25 HYDROXY: CPT

## 2024-12-04 RX ORDER — DOXYCYCLINE HYCLATE 100 MG
100 TABLET ORAL 2 TIMES DAILY
Qty: 42 TABLET | Refills: 0 | Status: SHIPPED | OUTPATIENT
Start: 2024-12-04 | End: 2024-12-25

## 2024-12-04 RX ORDER — PREDNISONE 20 MG/1
60 TABLET ORAL DAILY
Qty: 21 TABLET | Refills: 0 | Status: SHIPPED | OUTPATIENT
Start: 2024-12-04 | End: 2024-12-11

## 2024-12-04 NOTE — TELEPHONE ENCOUNTER
[employer] prompted a call back for an authorization for Case #[id number]   Approved #[id number] for [facility] CPT 63449 valid from 12/04/2024 - 06/02/2025 colmean
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
## 2024-12-17 ENCOUNTER — HOSPITAL ENCOUNTER (OUTPATIENT)
Dept: CT IMAGING | Facility: HOSPITAL | Age: 59
Discharge: HOME | End: 2024-12-17
Attending: STUDENT IN AN ORGANIZED HEALTH CARE EDUCATION/TRAINING PROGRAM
Payer: COMMERCIAL

## 2024-12-17 ENCOUNTER — OFFICE VISIT (OUTPATIENT)
Dept: SURGERY | Facility: CLINIC | Age: 59
End: 2024-12-17
Payer: COMMERCIAL

## 2024-12-17 DIAGNOSIS — N40.1 BPH WITH OBSTRUCTION/LOWER URINARY TRACT SYMPTOMS: Primary | ICD-10-CM

## 2024-12-17 DIAGNOSIS — N13.8 BPH WITH OBSTRUCTION/LOWER URINARY TRACT SYMPTOMS: Primary | ICD-10-CM

## 2024-12-17 DIAGNOSIS — J32.4 CHRONIC PANSINUSITIS: ICD-10-CM

## 2024-12-17 LAB
APPEARANCE: CLEAR
BILIRUBIN: NEGATIVE
GLUCOSE (URINE DIPSTICK): NEGATIVE MG/DL
HYALINE CASTS #/AREA URNS AUTO: PRESENT /LPF
KETONES (URINE DIPSTICK): NEGATIVE MG/DL
LEUKOCYTES: NEGATIVE
MULTISTIX LOT#: ABNORMAL NUMERIC
NITRITE, URINE: NEGATIVE
PH, URINE: 5.5 (ref 4.5–8)
PROTEIN (URINE DIPSTICK): 30 MG/DL
SPECIFIC GRAVITY: >=1.03 (ref 1–1.03)
URINE-COLOR: YELLOW
UROBILINOGEN,SEMI-QN: 0.2 MG/DL (ref 0–1.9)

## 2024-12-17 PROCEDURE — 81003 URINALYSIS AUTO W/O SCOPE: CPT | Performed by: SURGERY

## 2024-12-17 PROCEDURE — 99214 OFFICE O/P EST MOD 30 MIN: CPT | Performed by: SURGERY

## 2024-12-17 PROCEDURE — 70486 CT MAXILLOFACIAL W/O DYE: CPT | Performed by: STUDENT IN AN ORGANIZED HEALTH CARE EDUCATION/TRAINING PROGRAM

## 2024-12-17 RX ORDER — TAMSULOSIN HYDROCHLORIDE 0.4 MG/1
0.8 CAPSULE ORAL EVERY EVENING
Qty: 180 CAPSULE | Refills: 6 | Status: SHIPPED | OUTPATIENT
Start: 2024-12-17
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
## 2024-12-18 ENCOUNTER — OFFICE VISIT (OUTPATIENT)
Dept: NEUROLOGY | Facility: CLINIC | Age: 59
End: 2024-12-18
Payer: COMMERCIAL

## 2024-12-18 VITALS
HEART RATE: 81 BPM | RESPIRATION RATE: 16 BRPM | BODY MASS INDEX: 29 KG/M2 | WEIGHT: 200 LBS | DIASTOLIC BLOOD PRESSURE: 82 MMHG | SYSTOLIC BLOOD PRESSURE: 134 MMHG

## 2024-12-18 DIAGNOSIS — G25.0 ESSENTIAL TREMOR: Primary | ICD-10-CM

## 2024-12-18 PROCEDURE — 99213 OFFICE O/P EST LOW 20 MIN: CPT | Performed by: OTHER

## 2024-12-18 RX ORDER — PRIMIDONE 50 MG/1
TABLET ORAL
Qty: 270 TABLET | Refills: 1 | Status: SHIPPED | OUTPATIENT
Start: 2024-12-18

## 2024-12-18 NOTE — PROGRESS NOTES
Pt reports he has noticed an increase in tremors. Pt states he notices cramping in hands in the last few days.

## 2024-12-18 NOTE — PATIENT INSTRUCTIONS
After your visit at the [facility] today,  please direct any follow up questions or medication needs to the staff in our [place] office so that your concerns may be promptly addressed.  We are available through [employer] or at the numbers below:    The phone number is:   [phone] option #1    The fax number is:  [phone]    Your pharmacy should also send any requests electronically to the [place] office.  Refill policies:    Allow 2-3 business days for refills; controlled substances may take longer.  Contact your pharmacy at least 5 days prior to running out of medication and have them send an electronic request or submit request through the “request refill” option in your [employer] account.  Refills are not addressed on weekends; covering physicians do not authorize routine medications on weekends.  No narcotics or controlled substances are refilled after noon on Fridays or by on call physicians.  By law, narcotics must be electronically prescribed.  A 30 day supply with no refills is the maximum allowed.  If your prescription is due for a refill, you may be due for a follow up appointment.  To best provide you care, patients receiving routine medications need to be seen at least once a year.  Patients receiving narcotic/controlled substance medications need to be seen at least once every 3 months.  In the event that your preferred pharmacy does not have the requested medication in stock (e.g. Backordered), it is your responsibility to find another pharmacy that has the requested medication available.  We will gladly send a new prescription to that pharmacy at your request.    Scheduling Tests:    If your physician has ordered radiology tests such as MRI or CT scans, please contact Central Scheduling at [phone] right away to schedule the test.  Once scheduled, the [facility] Centralized Referral Team will work with your insurance carrier to obtain pre-certification or prior authorization.   Depending on your insurance carrier, approval may take 3-10 days.  It is highly recommended patients assure they have received an authorization before having a test performed.  If test is done without insurance authorization, patient may be responsible for the entire amount billed.      Precertification and Prior Authorizations:  If your physician has recommended that you have a procedure or additional testing performed the [facility] Centralized Referral Team will contact your insurance carrier to obtain pre-certification or prior authorization.    You are strongly encouraged to contact your insurance carrier to verify that your procedure/test has been approved and is a COVERED benefit.  Although the [facility] Centralized Referral Team does its due diligence, the insurance carrier gives the disclaimer that \"Although the procedure is authorized, this does not guarantee payment.\"    Ultimately the patient is responsible for payment.   Thank you for your understanding in this matter.  Paperwork Completion:  If you require FMLA or disability paperwork for your recovery, please make sure to either drop it off or have it faxed to our office at [phone]. Be sure the form has your name and date of birth on it.  The form will be faxed to our Forms Department and they will complete it for you.  There is a 25$ fee for all forms that need to be filled out.  Please be aware there is a 10-14 day turnaround time.  You will need to sign a release of information (TOBIN) form if your paperwork does not come with one.  You may call the Forms Department with any questions at [phone].  Their fax number is [phone].

## 2024-12-18 NOTE — PROGRESS NOTES
No RBC detected on microscopic UA.     Kaptur message sent to patient.    Future Appointments  12/18/2024 3:00 PM    Crispin Graham DO [facility]        [id number]  2/5/2025   1:30 PM    Abby Peralta APRN     [id number]            [facility]  12/16/2025 1:45 PM    Enrique Cesar MD           [id number]           EC [facility] [address]
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
## 2024-12-19 ENCOUNTER — TELEPHONE (OUTPATIENT)
Facility: LOCATION | Age: 59
End: 2024-12-19

## 2024-12-19 NOTE — TELEPHONE ENCOUNTER
Pt called stating he received his CT results and would like a call back to discuss results and next steps

## 2024-12-19 NOTE — TELEPHONE ENCOUNTER
Called patient to inform him of CT sinus results.  He has been compliant with regimen discussed at last clinic visit.  He will schedule a follow-up appointment in January or February.  All questions answered.    Ann Yeung MD, CARISSA  Facial Plastic & Reconstructive Surgery, Otolaryngology  [facility]

## 2024-12-23 RX ORDER — DOXYCYCLINE HYCLATE 100 MG
100 TABLET ORAL 2 TIMES DAILY
Qty: 42 TABLET | Refills: 0 | Status: SHIPPED | OUTPATIENT
Start: 2024-12-23 | End: 2025-01-13

## 2024-12-23 NOTE — TELEPHONE ENCOUNTER
Requested Prescriptions     Pending Prescriptions Disp Refills    DOXYCYCLINE HYCLATE 100 MG Oral Tab [Pharmacy Med Name: DOXYCYCLINE HYCLATE 100 MG TAB] 42 tablet 0     Sig: TAKE 1 TABLET (100 MG TOTAL) BY MOUTH 2 (TWO) TIMES DAILY FOR 21 DAYS.       FILLED-12/4/24  LOV- 12/4/24    No f/u scheduled

## 2025-01-10 RX ORDER — DOXYCYCLINE HYCLATE 100 MG
100 TABLET ORAL 2 TIMES DAILY
Qty: 42 TABLET | Refills: 0 | Status: SHIPPED | OUTPATIENT
Start: 2025-01-10 | End: 2025-01-31

## 2025-01-10 NOTE — TELEPHONE ENCOUNTER
Requested Prescriptions     Pending Prescriptions Disp Refills    DOXYCYCLINE HYCLATE 100 MG Oral Tab [Pharmacy Med Name: DOXYCYCLINE HYCLATE 100 MG TAB] 42 tablet 0     Sig: TAKE 1 TABLET (100 MG TOTAL) BY MOUTH 2 (TWO) TIMES DAILY FOR 21 DAYS.       FILLED- 12/23/24  LOV- 12/04/24    No f/u scheduled

## 2025-01-20 ENCOUNTER — OFFICE VISIT (OUTPATIENT)
Facility: LOCATION | Age: 60
End: 2025-01-20
Payer: COMMERCIAL

## 2025-01-20 DIAGNOSIS — J01.80 OTHER SUBACUTE SINUSITIS: Primary | ICD-10-CM

## 2025-01-20 RX ORDER — AZELASTINE 1 MG/ML
2 SPRAY, METERED NASAL 2 TIMES DAILY
Qty: 30 ML | Refills: 5 | Status: SHIPPED | OUTPATIENT
Start: 2025-01-20
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
## 2025-01-27 ENCOUNTER — OFFICE VISIT (OUTPATIENT)
Dept: INTERNAL MEDICINE CLINIC | Facility: CLINIC | Age: 60
End: 2025-01-27
Payer: OTHER MISCELLANEOUS

## 2025-01-27 VITALS
RESPIRATION RATE: 18 BRPM | WEIGHT: 200.63 LBS | BODY MASS INDEX: 28.72 KG/M2 | HEART RATE: 85 BPM | TEMPERATURE: 98 F | SYSTOLIC BLOOD PRESSURE: 146 MMHG | OXYGEN SATURATION: 98 % | DIASTOLIC BLOOD PRESSURE: 84 MMHG | HEIGHT: 70 IN

## 2025-01-27 DIAGNOSIS — H69.93 DYSFUNCTION OF BOTH EUSTACHIAN TUBES: Primary | ICD-10-CM

## 2025-01-27 DIAGNOSIS — H91.93 HEARING DECREASED, BILATERAL: ICD-10-CM

## 2025-01-27 PROCEDURE — 99214 OFFICE O/P EST MOD 30 MIN: CPT

## 2025-01-27 RX ORDER — OFLOXACIN 3 MG/ML
SOLUTION/ DROPS OPHTHALMIC
COMMUNITY
Start: 2025-01-26
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
## 2025-01-28 ENCOUNTER — TELEPHONE (OUTPATIENT)
Dept: INTERNAL MEDICINE CLINIC | Facility: CLINIC | Age: 60
End: 2025-01-28

## 2025-01-28 NOTE — TELEPHONE ENCOUNTER
Per Miroslava Harris, pt needing to see ENT to be cleared for work. Patient is a  and is having severe ear pain during take off, now with hearing changes. Please call ENT for expedited appointment.     Called [facility]. They were able to add pt onto schedule 2/5, pt will also need to see audiology.     Called and spoke to pt, informed him of new appointment info, pt appreciative. Patient stated he will need a note for work with providers name, address, and phone number. Letter will need to state he got ill on 1/22 and will be seeing specialist on 2/5, so he will need to be off work until seeing specialist. Letter will also need physical signature, pt asked for call when ready and he will  letter.     Miroslava Harris- could you write letter for pt? Will need to be printed/signed.
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
## 2025-01-30 ENCOUNTER — OFFICE VISIT (OUTPATIENT)
Dept: INTERNAL MEDICINE CLINIC | Facility: CLINIC | Age: 60
End: 2025-01-30
Payer: COMMERCIAL

## 2025-01-30 VITALS
BODY MASS INDEX: 28.63 KG/M2 | HEART RATE: 78 BPM | OXYGEN SATURATION: 97 % | DIASTOLIC BLOOD PRESSURE: 96 MMHG | TEMPERATURE: 98 F | HEIGHT: 70 IN | WEIGHT: 200 LBS | RESPIRATION RATE: 18 BRPM | SYSTOLIC BLOOD PRESSURE: 146 MMHG

## 2025-01-30 DIAGNOSIS — I10 ESSENTIAL HYPERTENSION: ICD-10-CM

## 2025-01-30 DIAGNOSIS — F41.9 ANXIETY: ICD-10-CM

## 2025-01-30 DIAGNOSIS — J32.9 CHRONIC SINUSITIS, UNSPECIFIED LOCATION: Primary | ICD-10-CM

## 2025-01-30 DIAGNOSIS — E78.00 PURE HYPERCHOLESTEROLEMIA: ICD-10-CM

## 2025-01-30 PROCEDURE — 99214 OFFICE O/P EST MOD 30 MIN: CPT | Performed by: NURSE PRACTITIONER

## 2025-01-30 RX ORDER — AMLODIPINE BESYLATE 10 MG/1
10 TABLET ORAL DAILY
Qty: 90 TABLET | Refills: 1 | Status: SHIPPED | OUTPATIENT
Start: 2025-01-30 | End: 2026-01-25
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
## 2025-02-05 ENCOUNTER — OFFICE VISIT (OUTPATIENT)
Facility: LOCATION | Age: 60
End: 2025-02-05
Payer: OTHER MISCELLANEOUS

## 2025-02-05 DIAGNOSIS — H93.293 ABNORMAL AUDITORY PERCEPTION OF BOTH EARS: Primary | ICD-10-CM

## 2025-02-05 DIAGNOSIS — H69.90 DYSFUNCTION OF EUSTACHIAN TUBE, UNSPECIFIED LATERALITY: Primary | ICD-10-CM

## 2025-02-05 PROCEDURE — 92557 COMPREHENSIVE HEARING TEST: CPT | Performed by: AUDIOLOGIST

## 2025-02-05 PROCEDURE — 99212 OFFICE O/P EST SF 10 MIN: CPT | Performed by: STUDENT IN AN ORGANIZED HEALTH CARE EDUCATION/TRAINING PROGRAM

## 2025-02-05 PROCEDURE — 92567 TYMPANOMETRY: CPT | Performed by: AUDIOLOGIST
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
## 2025-02-05 NOTE — PROGRESS NOTES
Dex Jack was seen for an audiometric evaluation and tympanogram today. Referred back to physician.    Ursula Kelly M.A. CHICOA

## 2025-02-06 ENCOUNTER — TELEPHONE (OUTPATIENT)
Facility: LOCATION | Age: 60
End: 2025-02-06

## 2025-02-21 LAB
ALBUMIN/GLOBULIN RATIO: 1.8 (CALC) (ref 1–2.5)
ALBUMIN: 4.6 G/DL (ref 3.6–5.1)
ALKALINE PHOSPHATASE: 74 U/L (ref 35–144)
ALT: 26 U/L (ref 9–46)
AST: 32 U/L (ref 10–35)
BILIRUBIN, TOTAL: 0.4 MG/DL (ref 0.2–1.2)
BUN: 17 MG/DL (ref 7–25)
CALCIUM: 9.3 MG/DL (ref 8.6–10.3)
CARBON DIOXIDE: 26 MMOL/L (ref 20–32)
CHLORIDE: 104 MMOL/L (ref 98–110)
CHOL/HDLC RATIO: 3.5 (CALC)
CHOLESTEROL, TOTAL: 261 MG/DL
CREATININE: 1.02 MG/DL (ref 0.7–1.35)
EGFR: 84 ML/MIN/1.73M2
GLOBULIN: 2.5 G/DL (CALC) (ref 1.9–3.7)
GLUCOSE: 89 MG/DL (ref 65–99)
HDL CHOLESTEROL: 75 MG/DL
LDL-CHOLESTEROL: 155 MG/DL (CALC)
NON-HDL CHOLESTEROL: 186 MG/DL (CALC)
POTASSIUM: 4 MMOL/L (ref 3.5–5.3)
PROTEIN, TOTAL: 7.1 G/DL (ref 6.1–8.1)
SODIUM: 140 MMOL/L (ref 135–146)
TRIGLYCERIDES: 179 MG/DL
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
## 2025-02-24 ENCOUNTER — OFFICE VISIT (OUTPATIENT)
Dept: INTERNAL MEDICINE CLINIC | Facility: CLINIC | Age: 60
End: 2025-02-24
Payer: COMMERCIAL

## 2025-02-24 VITALS
WEIGHT: 198 LBS | BODY MASS INDEX: 28.35 KG/M2 | OXYGEN SATURATION: 95 % | SYSTOLIC BLOOD PRESSURE: 132 MMHG | HEART RATE: 79 BPM | DIASTOLIC BLOOD PRESSURE: 78 MMHG | HEIGHT: 70 IN

## 2025-02-24 DIAGNOSIS — Z13.0 SCREENING FOR BLOOD DISEASE: ICD-10-CM

## 2025-02-24 DIAGNOSIS — E78.5 DYSLIPIDEMIA: Primary | ICD-10-CM

## 2025-02-24 DIAGNOSIS — Z13.29 SCREENING FOR THYROID DISORDER: ICD-10-CM

## 2025-02-24 DIAGNOSIS — F41.9 ANXIETY: ICD-10-CM

## 2025-02-24 DIAGNOSIS — I10 ESSENTIAL HYPERTENSION: ICD-10-CM

## 2025-02-24 DIAGNOSIS — Z12.5 SCREENING FOR PROSTATE CANCER: ICD-10-CM

## 2025-02-24 PROCEDURE — 99214 OFFICE O/P EST MOD 30 MIN: CPT | Performed by: NURSE PRACTITIONER

## 2025-02-24 RX ORDER — ATORVASTATIN CALCIUM 10 MG/1
10 TABLET, FILM COATED ORAL NIGHTLY
Qty: 90 TABLET | Refills: 1 | Status: SHIPPED | OUTPATIENT
Start: 2025-02-24

## 2025-03-14 RX ORDER — OLMESARTAN MEDOXOMIL 40 MG/1
40 TABLET ORAL DAILY
Qty: 90 TABLET | Refills: 1 | Status: SHIPPED | OUTPATIENT
Start: 2025-03-14

## 2025-03-17 ENCOUNTER — TELEPHONE (OUTPATIENT)
Facility: LOCATION | Age: 60
End: 2025-03-17

## 2025-03-19 ENCOUNTER — TELEPHONE (OUTPATIENT)
Facility: LOCATION | Age: 60
End: 2025-03-19

## 2025-03-19 NOTE — TELEPHONE ENCOUNTER
Pt called for surgical questions.  Made a Follow up appt on 03/31/2025 at 12:30pm.  Surgery tentative date 05/23/2025 will put in surgical case request after OV on 03/31/25 coleman

## 2025-03-31 ENCOUNTER — OFFICE VISIT (OUTPATIENT)
Facility: LOCATION | Age: 60
End: 2025-03-31
Payer: COMMERCIAL

## 2025-03-31 DIAGNOSIS — J34.829 NASAL VALVE COLLAPSE: ICD-10-CM

## 2025-03-31 DIAGNOSIS — J34.2 NASAL SEPTAL DEVIATION: Primary | ICD-10-CM

## 2025-03-31 DIAGNOSIS — J34.3 NASAL TURBINATE HYPERTROPHY: ICD-10-CM

## 2025-03-31 DIAGNOSIS — J34.89 NASAL OBSTRUCTION: ICD-10-CM

## 2025-03-31 PROCEDURE — 99214 OFFICE O/P EST MOD 30 MIN: CPT | Performed by: STUDENT IN AN ORGANIZED HEALTH CARE EDUCATION/TRAINING PROGRAM
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
## 2025-04-01 DIAGNOSIS — J34.2 DEVIATED NASAL SEPTUM: Primary | ICD-10-CM

## 2025-04-01 DIAGNOSIS — J34.829 NASAL VALVE COLLAPSE: ICD-10-CM

## 2025-04-01 DIAGNOSIS — J34.3 HYPERTROPHY OF NASAL TURBINATES: ICD-10-CM

## 2025-05-02 ENCOUNTER — TELEPHONE (OUTPATIENT)
Facility: LOCATION | Age: 60
End: 2025-05-02

## 2025-05-02 ENCOUNTER — TELEPHONE (OUTPATIENT)
Dept: INTERNAL MEDICINE CLINIC | Facility: CLINIC | Age: 60
End: 2025-05-02

## 2025-05-02 NOTE — TELEPHONE ENCOUNTER
Submitted Authorization on 04/01/2025 had to call [facility]ide Handling Team spoke to Csv Rep Teri MEADOWS for a Nasal Septoplasty CPT 30520 x 1 unit; Bilateral Out Fracture of Inferior Turbinates CPT 30930 x 2 units; Bilateral Coblation Submucous Resection of Inferior Turbinates CPT 30140 x 2 units; Nasal Valve Repair CPT 24190.  Pending Auth #[id number] faxed clinicals to [phone]    On 04/02/2025  Nurse Reviewer Irish DONOVAN called stated authorization No Required under patient's plan can proceed with surgery for 05/23/2025.    05/02/2025 Patient left me a vm to verify with [employer] called [phone] spoke to Sherrie CUMMINS  No Authorization is required [facility]

## 2025-05-02 NOTE — TELEPHONE ENCOUNTER
Per Estefanía, surgery scheduler with Dr. Ann Wahl's office, patient does not need pre-op clearance for the below procedure.  I advised patient that pre-op was cancelled.    Estefanía requested for patient to call their office to go over medication.  Patient aware and will contact their office.      Their office number is [phone]

## 2025-05-02 NOTE — TELEPHONE ENCOUNTER
Patient is scheduled for deviated septum surgery on 5/23, wondering what medications he needs to hold prior to surgery. Patient has pre op appointment on 5/5. Advised him that APRN will review medications that need to be stopped at time of appointment. He confirms understanding.

## 2025-05-16 ENCOUNTER — EKG ENCOUNTER (OUTPATIENT)
Dept: LAB | Age: 60
End: 2025-05-16
Attending: STUDENT IN AN ORGANIZED HEALTH CARE EDUCATION/TRAINING PROGRAM
Payer: COMMERCIAL

## 2025-05-16 DIAGNOSIS — Z01.818 PRE-OP TESTING: ICD-10-CM

## 2025-05-16 LAB
ATRIAL RATE: 82 BPM
P AXIS: 27 DEGREES
P-R INTERVAL: 168 MS
Q-T INTERVAL: 346 MS
QRS DURATION: 92 MS
QTC CALCULATION (BEZET): 404 MS
R AXIS: 1 DEGREES
T AXIS: 2 DEGREES
VENTRICULAR RATE: 82 BPM

## 2025-05-16 PROCEDURE — 93005 ELECTROCARDIOGRAM TRACING: CPT

## 2025-05-16 PROCEDURE — 93010 ELECTROCARDIOGRAM REPORT: CPT | Performed by: INTERNAL MEDICINE

## 2025-05-17 ENCOUNTER — ANESTHESIA EVENT (OUTPATIENT)
Dept: SURGERY | Facility: HOSPITAL | Age: 60
End: 2025-05-17
Payer: COMMERCIAL

## 2025-05-23 ENCOUNTER — HOSPITAL ENCOUNTER (OUTPATIENT)
Facility: HOSPITAL | Age: 60
Setting detail: HOSPITAL OUTPATIENT SURGERY
Discharge: HOME | End: 2025-05-23
Attending: STUDENT IN AN ORGANIZED HEALTH CARE EDUCATION/TRAINING PROGRAM | Admitting: STUDENT IN AN ORGANIZED HEALTH CARE EDUCATION/TRAINING PROGRAM
Payer: COMMERCIAL

## 2025-05-23 ENCOUNTER — ANESTHESIA (OUTPATIENT)
Dept: SURGERY | Facility: HOSPITAL | Age: 60
End: 2025-05-23
Payer: COMMERCIAL

## 2025-05-23 VITALS
RESPIRATION RATE: 18 BRPM | DIASTOLIC BLOOD PRESSURE: 90 MMHG | WEIGHT: 187.63 LBS | BODY MASS INDEX: 26.86 KG/M2 | HEIGHT: 70 IN | SYSTOLIC BLOOD PRESSURE: 165 MMHG | HEART RATE: 72 BPM | OXYGEN SATURATION: 96 % | TEMPERATURE: 99 F

## 2025-05-23 DIAGNOSIS — J34.2 DEVIATED NASAL SEPTUM: ICD-10-CM

## 2025-05-23 DIAGNOSIS — Z01.818 PRE-OP TESTING: Primary | ICD-10-CM

## 2025-05-23 PROBLEM — J34.3 NASAL TURBINATE HYPERTROPHY: Status: ACTIVE | Noted: 2025-05-23

## 2025-05-23 PROCEDURE — 30465 REPAIR NASAL STENOSIS: CPT | Performed by: STUDENT IN AN ORGANIZED HEALTH CARE EDUCATION/TRAINING PROGRAM

## 2025-05-23 PROCEDURE — 30520 REPAIR OF NASAL SEPTUM: CPT | Performed by: STUDENT IN AN ORGANIZED HEALTH CARE EDUCATION/TRAINING PROGRAM

## 2025-05-23 PROCEDURE — 30802 ABLATE INF TURBINATE SUBMUC: CPT | Performed by: STUDENT IN AN ORGANIZED HEALTH CARE EDUCATION/TRAINING PROGRAM

## 2025-05-23 RX ORDER — MIDAZOLAM HYDROCHLORIDE 1 MG/ML
1 INJECTION INTRAMUSCULAR; INTRAVENOUS EVERY 5 MIN PRN
Status: DISCONTINUED | OUTPATIENT
Start: 2025-05-23 | End: 2025-05-23

## 2025-05-23 RX ORDER — LIDOCAINE HYDROCHLORIDE AND EPINEPHRINE 10; 10 MG/ML; UG/ML
INJECTION, SOLUTION INFILTRATION; PERINEURAL AS NEEDED
Status: DISCONTINUED | OUTPATIENT
Start: 2025-05-23 | End: 2025-05-23 | Stop reason: HOSPADM

## 2025-05-23 RX ORDER — ONDANSETRON 2 MG/ML
4 INJECTION INTRAMUSCULAR; INTRAVENOUS EVERY 6 HOURS PRN
Status: DISCONTINUED | OUTPATIENT
Start: 2025-05-23 | End: 2025-05-23

## 2025-05-23 RX ORDER — OXYMETAZOLINE HYDROCHLORIDE 0.05 G/100ML
SPRAY NASAL AS NEEDED
Status: DISCONTINUED | OUTPATIENT
Start: 2025-05-23 | End: 2025-05-23 | Stop reason: HOSPADM

## 2025-05-23 RX ORDER — SCOPOLAMINE 1 MG/3D
1 PATCH, EXTENDED RELEASE TRANSDERMAL ONCE
Status: DISCONTINUED | OUTPATIENT
Start: 2025-05-23 | End: 2025-05-23 | Stop reason: HOSPADM

## 2025-05-23 RX ORDER — LIDOCAINE HYDROCHLORIDE 40 MG/ML
INJECTION, SOLUTION RETROBULBAR AS NEEDED
Status: DISCONTINUED | OUTPATIENT
Start: 2025-05-23 | End: 2025-05-23 | Stop reason: SURG

## 2025-05-23 RX ORDER — HYDROCODONE BITARTRATE AND ACETAMINOPHEN 5; 325 MG/1; MG/1
1 TABLET ORAL ONCE AS NEEDED
Status: COMPLETED | OUTPATIENT
Start: 2025-05-23 | End: 2025-05-23

## 2025-05-23 RX ORDER — SODIUM CHLORIDE, SODIUM LACTATE, POTASSIUM CHLORIDE, CALCIUM CHLORIDE 600; 310; 30; 20 MG/100ML; MG/100ML; MG/100ML; MG/100ML
INJECTION, SOLUTION INTRAVENOUS CONTINUOUS
Status: DISCONTINUED | OUTPATIENT
Start: 2025-05-23 | End: 2025-05-23

## 2025-05-23 RX ORDER — MEPERIDINE HYDROCHLORIDE 25 MG/ML
12.5 INJECTION INTRAMUSCULAR; INTRAVENOUS; SUBCUTANEOUS AS NEEDED
Status: DISCONTINUED | OUTPATIENT
Start: 2025-05-23 | End: 2025-05-23

## 2025-05-23 RX ORDER — ACETAMINOPHEN 500 MG
1000 TABLET ORAL ONCE AS NEEDED
Status: COMPLETED | OUTPATIENT
Start: 2025-05-23 | End: 2025-05-23

## 2025-05-23 RX ORDER — PROCHLORPERAZINE EDISYLATE 5 MG/ML
5 INJECTION INTRAMUSCULAR; INTRAVENOUS EVERY 8 HOURS PRN
Status: DISCONTINUED | OUTPATIENT
Start: 2025-05-23 | End: 2025-05-23

## 2025-05-23 RX ORDER — LIDOCAINE HYDROCHLORIDE 10 MG/ML
INJECTION, SOLUTION EPIDURAL; INFILTRATION; INTRACAUDAL; PERINEURAL AS NEEDED
Status: DISCONTINUED | OUTPATIENT
Start: 2025-05-23 | End: 2025-05-23 | Stop reason: SURG

## 2025-05-23 RX ORDER — HYDROCODONE BITARTRATE AND ACETAMINOPHEN 5; 325 MG/1; MG/1
1 TABLET ORAL EVERY 6 HOURS PRN
Qty: 15 TABLET | Refills: 0 | Status: SHIPPED | OUTPATIENT
Start: 2025-05-23

## 2025-05-23 RX ORDER — HYDROMORPHONE HYDROCHLORIDE 1 MG/ML
0.2 INJECTION, SOLUTION INTRAMUSCULAR; INTRAVENOUS; SUBCUTANEOUS EVERY 5 MIN PRN
Status: DISCONTINUED | OUTPATIENT
Start: 2025-05-23 | End: 2025-05-23

## 2025-05-23 RX ORDER — BACITRACIN ZINC AND POLYMYXIN B SULFATE 500; 10000 [USP'U]/G; [USP'U]/G
OINTMENT TOPICAL AS NEEDED
Status: DISCONTINUED | OUTPATIENT
Start: 2025-05-23 | End: 2025-05-23 | Stop reason: HOSPADM

## 2025-05-23 RX ORDER — EPHEDRINE SULFATE 50 MG/ML
INJECTION INTRAVENOUS AS NEEDED
Status: DISCONTINUED | OUTPATIENT
Start: 2025-05-23 | End: 2025-05-23 | Stop reason: SURG

## 2025-05-23 RX ORDER — ACETAMINOPHEN 500 MG
1000 TABLET ORAL ONCE
Status: DISCONTINUED | OUTPATIENT
Start: 2025-05-23 | End: 2025-05-23 | Stop reason: HOSPADM

## 2025-05-23 RX ORDER — HYDROMORPHONE HYDROCHLORIDE 1 MG/ML
0.6 INJECTION, SOLUTION INTRAMUSCULAR; INTRAVENOUS; SUBCUTANEOUS EVERY 5 MIN PRN
Status: DISCONTINUED | OUTPATIENT
Start: 2025-05-23 | End: 2025-05-23

## 2025-05-23 RX ORDER — SODIUM CHLORIDE/ALOE VERA
GEL (GRAM) NASAL AS NEEDED
Status: DISCONTINUED | OUTPATIENT
Start: 2025-05-23 | End: 2025-05-23 | Stop reason: HOSPADM

## 2025-05-23 RX ORDER — DIPHENHYDRAMINE HYDROCHLORIDE 50 MG/ML
12.5 INJECTION, SOLUTION INTRAMUSCULAR; INTRAVENOUS AS NEEDED
Status: DISCONTINUED | OUTPATIENT
Start: 2025-05-23 | End: 2025-05-23

## 2025-05-23 RX ORDER — HYDROMORPHONE HYDROCHLORIDE 1 MG/ML
INJECTION, SOLUTION INTRAMUSCULAR; INTRAVENOUS; SUBCUTANEOUS
Status: COMPLETED
Start: 2025-05-23 | End: 2025-05-23

## 2025-05-23 RX ORDER — ROCURONIUM BROMIDE 10 MG/ML
INJECTION, SOLUTION INTRAVENOUS AS NEEDED
Status: DISCONTINUED | OUTPATIENT
Start: 2025-05-23 | End: 2025-05-23 | Stop reason: SURG

## 2025-05-23 RX ORDER — ONDANSETRON 2 MG/ML
INJECTION INTRAMUSCULAR; INTRAVENOUS AS NEEDED
Status: DISCONTINUED | OUTPATIENT
Start: 2025-05-23 | End: 2025-05-23 | Stop reason: SURG

## 2025-05-23 RX ORDER — CEPHALEXIN 500 MG/1
500 CAPSULE ORAL 2 TIMES DAILY
Qty: 14 CAPSULE | Refills: 0 | Status: SHIPPED | OUTPATIENT
Start: 2025-05-23 | End: 2025-05-30

## 2025-05-23 RX ORDER — HYDROCODONE BITARTRATE AND ACETAMINOPHEN 5; 325 MG/1; MG/1
2 TABLET ORAL ONCE AS NEEDED
Status: COMPLETED | OUTPATIENT
Start: 2025-05-23 | End: 2025-05-23

## 2025-05-23 RX ORDER — NALOXONE HYDROCHLORIDE 0.4 MG/ML
0.08 INJECTION, SOLUTION INTRAMUSCULAR; INTRAVENOUS; SUBCUTANEOUS AS NEEDED
Status: DISCONTINUED | OUTPATIENT
Start: 2025-05-23 | End: 2025-05-23

## 2025-05-23 RX ORDER — DEXAMETHASONE SODIUM PHOSPHATE 4 MG/ML
VIAL (ML) INJECTION AS NEEDED
Status: DISCONTINUED | OUTPATIENT
Start: 2025-05-23 | End: 2025-05-23 | Stop reason: SURG

## 2025-05-23 RX ORDER — ECHINACEA PURPUREA EXTRACT 125 MG
2 TABLET ORAL 4 TIMES DAILY
Qty: 30 ML | Refills: 0 | Status: SHIPPED | OUTPATIENT
Start: 2025-05-23

## 2025-05-23 RX ORDER — HYDROMORPHONE HYDROCHLORIDE 1 MG/ML
0.4 INJECTION, SOLUTION INTRAMUSCULAR; INTRAVENOUS; SUBCUTANEOUS EVERY 5 MIN PRN
Status: DISCONTINUED | OUTPATIENT
Start: 2025-05-23 | End: 2025-05-23

## 2025-05-23 RX ADMIN — EPHEDRINE SULFATE 5 MG: 50 INJECTION INTRAVENOUS at 16:42:00

## 2025-05-23 RX ADMIN — LIDOCAINE HYDROCHLORIDE 50 MG: 10 INJECTION, SOLUTION EPIDURAL; INFILTRATION; INTRACAUDAL; PERINEURAL at 15:56:00

## 2025-05-23 RX ADMIN — ROCURONIUM BROMIDE 5 MG: 10 INJECTION, SOLUTION INTRAVENOUS at 15:56:00

## 2025-05-23 RX ADMIN — ONDANSETRON 4 MG: 2 INJECTION INTRAMUSCULAR; INTRAVENOUS at 16:47:00

## 2025-05-23 RX ADMIN — SODIUM CHLORIDE, SODIUM LACTATE, POTASSIUM CHLORIDE, CALCIUM CHLORIDE: 600; 310; 30; 20 INJECTION, SOLUTION INTRAVENOUS at 16:57:00

## 2025-05-23 RX ADMIN — DEXAMETHASONE SODIUM PHOSPHATE 8 MG: 4 MG/ML VIAL (ML) INJECTION at 16:01:00

## 2025-05-23 RX ADMIN — LIDOCAINE HYDROCHLORIDE 4 ML: 40 INJECTION, SOLUTION RETROBULBAR at 15:58:00

## 2025-05-23 RX ADMIN — SODIUM CHLORIDE, SODIUM LACTATE, POTASSIUM CHLORIDE, CALCIUM CHLORIDE: 600; 310; 30; 20 INJECTION, SOLUTION INTRAVENOUS at 15:49:00

## 2025-05-23 NOTE — DISCHARGE INSTRUCTIONS
Postoperative Instructions  Ann Yeung MD, CARISSA  [address] [place] [address]  [phone]    Wound care:  Change nasal drip pad as needed until discharge has stopped (typically 2-3 days after surgery)  Please clean the inside of the nostrils gently with a cotton swab and saline or peroxide twice daily. Apply a thin bacitracin ointment after. Do this for 2 weeks after surgery.  You have splints in your nose and a cast on the outside of your nose to assist in the healing process. Both will be removed at your follow-up appointment.   Use saline nasal spray at least 4 times daily to keep the splints clean and your nasal passages open  You may shower from the neck down, do not allow direct water flow over your nose.   Medications:  If an antibiotic is prescribed: Start it when you get home and take it with food  You may take Tylenol (acetaminophen) as needed for pain. A narcotic pain medication (Norco) has also been prescribed. You may alternate it with Tylenol and ibuprofen. The narcotic medication also has acetaminophen in it. Be careful not to take more than 4,000 mg of acetaminophen in a 24 hour period.   The narcotic pain medication may also cause constipation. Take stool softeners with this medication. Do not operate heavy machinery or drive while taking a narcotic pain medication.   DO NOT:  o Insert anything into your nose  o Blow your nose for 2 weeks after surgery (you may gently wipe)  o Participate in any strenuous activity for 2 weeks (no lifting more than 10 pounds, bending, straining). Walking is encouraged.  Further instructions:  Keep your head elevated while resting or sleeping. Sleep on your back or side   Use caution when brushing your teeth as they may be numb temporarily  You may consume a regular diet as tolerated  Please notify your physician at [phone] if:  Uncontrolled severe pain  Uncontrolled severe nausea/vomiting  Fever greater than 100.5° F  Unusual/Excessive  bleeding
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
## 2025-05-23 NOTE — ANESTHESIA PROCEDURE NOTES
Airway  Date/Time: 5/23/2025 3:59 PM  Reason: elective      General Information and Staff   Patient location during procedure: OR  Anesthesiologist: Ash Katz DO  Resident/CRNA: Rigo Payne CRNA  Performed: CRNA   Performed by: Rigo Payne CRNA  Authorized by: Ash Katz DO        Indications and Patient Condition  Indications for airway management: anesthesia  Sedation level: deep      Preoxygenated: yesPatient position: sniffing    Mask difficulty assessment: 0 - not attempted    Final Airway Details    Final airway type: endotracheal airway    Successful airway: ETT  Cuffed: yes   Successful intubation technique: Video laryngoscopy  Endotracheal tube insertion site: oral  Blade: GlideScope  Blade size: #3  ETT size (mm): 6.5    Cormack-Lehane Classification: grade I - full view of glottis  Placement verified by: capnometry   Measured from: lips  ETT to lips (cm): 23  Number of attempts at approach: 1    Additional Comments  Atraumatic uneventful intubation

## 2025-05-23 NOTE — ANESTHESIA POSTPROCEDURE EVALUATION
[facility]    Dex Jack Patient Status:  Hospital Outpatient Surgery   Age/Gender 60 year old male MRN [id number]   Location [facility] POST ANESTHESIA CARE UNIT Attending Ann Yeung MD   Hosp Day # 0 PCP Adam Schriedel, MD       Anesthesia Post-op Note    Nasal Septoplasty; Nasal Valve Repair; Bilateral Out Fracture of Inferior Turbinates;  Bilateral Coblation Submucous Resection of Inferior Turbinates    Procedure Summary       Date: 05/23/25 Room / Location:  MAIN OR 02 / [facility] MAIN OR    Anesthesia Start: 1548 Anesthesia Stop: 1700    Procedure: Nasal Septoplasty; Nasal Valve Repair; Bilateral Out Fracture of Inferior Turbinates;  Bilateral Coblation Submucous Resection of Inferior Turbinates (Bilateral: Nose) Diagnosis:       Deviated nasal septum      Nasal valve collapse      Hypertrophy of nasal turbinates      (Deviated nasal septum [J34.2]Nasal valve collapse [J34.829]Hypertrophy of nasal turbinates [J34.3])    Surgeons: Ann Yeung MD Anesthesiologist: Ash Katz DO    Anesthesia Type: general ASA Status: 2            Anesthesia Type: general    Vitals Value Taken Time   /94 05/23/25 17:01   Temp 98.1 °F (36.7 °C) 05/23/25 17:00   Pulse 68 05/23/25 17:03   Resp 18 05/23/25 17:03   SpO2 97 % 05/23/25 17:03   Vitals shown include unfiled device data.        Patient Location: PACU    Anesthesia Type: general    Airway Patency: patent    Postop Pain Control: adequate    Mental Status: mildly sedated but able to meaningfully participate in the post-anesthesia evaluation    Nausea/Vomiting: none    Cardiopulmonary/Hydration status: stable euvolemic    Complications: no apparent anesthesia related complications    Postop vital signs: stable    Dental Exam: Unchanged from Preop    Patient to be discharged from PACU when criteria met.

## 2025-05-23 NOTE — OPERATIVE REPORT
Operative Note      Dex Jack Location: OR   [facility] [id number] MRN [id number]   Admission Date 5/23/2025 Operation Date 5/23/2025   Attending Physician Ann Yeung MD Operating Physician Ann Yeung MD       Patient Name: Dex Jack    Preoperative Diagnosis: Deviated nasal septum [J34.2]  Nasal valve collapse [J34.829]  Hypertrophy of nasal turbinates [J34.3]    Postoperative Diagnosis: Deviated nasal septum [J34.2]Nasal valve collapse [J34.829]Hypertrophy of nasal turbinates [J34.3]     Primary Surgeon: Ann Yeung MD, CARISSA    Assistant: none    Procedure:   Repair deviated nasal septum (CPT 38519)  Submucous coblation bilateral inferior turbinates (CPT 14875)  External nasal valve repair (CPT 16197)    Surgical Findings: external nasal valve collapse, bilateral turbinate hypertrophy, left septal deviation    Anesthesia: General    Complications: none    Implants: Cortes splints bilaterally    Specimen: none    Drains: none    Condition: stable    Estimated Blood Loss: 5 ml    Indications: 60 y M with nasal obstruction despite maximal medical therapy.  Patient with external nasal valve collapse, deviated nasal septum and turbinate hypertrophy on examination. Risks, benefits and alternatives were discussed including but not limited to pain, bleeding, infection, persistent nasal obstruction, septal perforation, teeth numbness and need for reoperation.     Procedure Details: After informed consent was obtained, the patient was brought from the preoperative holding area to the operating room and placed on the operating table. After the smooth induction of anesthesia, the patient nose was infiltrated with 1% lidocaine with 1:100,000 epinephrine.  The patient was then sterilely prepped and draped in the standard surgical fashion.      A left beltran transfixion incision was created using a 15 blade and the ipsilateral mucoperichondrial flap was elevated using a combination of Jonathan and [place] elevators. A  crossover incision was made and the contralateral mucoperichondrial flap was elevated. A swivel knife was used to remove the deviate portions of cartilage, taking care to leave a generous 2 cm L strut. Incisions were closed with a 5-0 chromic.     Attention was then turned to turbinate reduction.  A Coblator on a setting of 6 was used to make multiple passes in a submucosal fashion starting in the left inferior turbinate.  An identical procedure was performed on the right.  A Giltner elevator was then used to perform first in fracture followed by outfracture of both inferior turbinates.      Attention was then turned to external nasal valve repair. A left intercartilaginous incision was executed with a 15 blade. Sharp dissection was carried to the pyriform. The previously harvested septal cartilage was carved and then placed in the pocket. The incision was closed with interrupted 5-0 chromic. An identical procedure was performed on the right side.     Cortes splints were then placed in each nasal cavity and secured using 2-0 Prolene.  The patient's orogastric contents were suctioned.  A [place] splint was placed.     The patient's care was turned over to the anesthesia team who successfully awakened the patient without complication.  All instrument sharp and lap counts were correct at the end of the case. The patient was transported to the recovery area in stable condition.       Ann Yeung MD, CARISSA  Facial Plastic & Reconstructive Surgery, Otolaryngology-Head & Neck Surgery  [facility]

## 2025-05-27 ENCOUNTER — TELEPHONE (OUTPATIENT)
Facility: LOCATION | Age: 60
End: 2025-05-27

## 2025-05-27 ENCOUNTER — MED REC SCAN ONLY (OUTPATIENT)
Facility: LOCATION | Age: 60
End: 2025-05-27

## 2025-05-27 NOTE — TELEPHONE ENCOUNTER
Pt has questions about nose guard and wants to know how he can keep it in place better. Pt is scheduled for a POST OP appt this Thursday 5/29/25 at 1:50 pm with Dr. Yeung

## 2025-05-29 ENCOUNTER — OFFICE VISIT (OUTPATIENT)
Facility: LOCATION | Age: 60
End: 2025-05-29
Payer: COMMERCIAL

## 2025-05-29 DIAGNOSIS — J34.2 DEVIATED NASAL SEPTUM: Primary | ICD-10-CM

## 2025-05-29 PROCEDURE — 99024 POSTOP FOLLOW-UP VISIT: CPT | Performed by: STUDENT IN AN ORGANIZED HEALTH CARE EDUCATION/TRAINING PROGRAM

## 2025-05-29 NOTE — PROGRESS NOTES
POSTOPERATIVE VISIT    60 year-old M who underwent septoturb, external nasal valve repair on 5/23. The patient tolerated the procedure well without issues.     The patient returns for post-op follow-up and is doing well with no complaints. They have been completing wound care as instructed.  Remaining constitutional symptoms are unremarkable.     Physical Exam:  General - The patient is normocephalic and atraumatic  Eyes - Extraocular motions are intact, vision is intact  Nose - Nose is midline, no external trauma, Cortes splints in place, removed, septum midline, turbinates well reduced  Oral cavity- Lips are normal  Neuro - Alert with normal affect  CN - 2-12 grossly intact and symmetric bilaterally    Plan:  - Continue saline nasal spray  - Follow up in 4-6 weeks    Ann Yeung MD, CARISSA  Facial Plastic & Reconstructive Surgery, Otolaryngology-Head & Neck Surgery  [facility]

## 2025-06-11 ENCOUNTER — HOSPITAL ENCOUNTER (OUTPATIENT)
Dept: CT IMAGING | Age: 60
Discharge: HOME | End: 2025-06-11
Attending: NURSE PRACTITIONER

## 2025-06-11 DIAGNOSIS — Z13.9 ENCOUNTER FOR SCREENING: ICD-10-CM

## 2025-06-11 DIAGNOSIS — Z13.6 SCREENING FOR CARDIOVASCULAR CONDITION: ICD-10-CM

## 2025-06-17 LAB
ABSOLUTE BASOPHILS: 80 CELLS/UL (ref 0–200)
ABSOLUTE EOSINOPHILS: 304 CELLS/UL (ref 15–500)
ABSOLUTE LYMPHOCYTES: 1568 CELLS/UL (ref 850–3900)
ABSOLUTE MONOCYTES: 608 CELLS/UL (ref 200–950)
ABSOLUTE NEUTROPHILS: 5440 CELLS/UL (ref 1500–7800)
ALBUMIN/GLOBULIN RATIO: 1.7 (CALC) (ref 1–2.5)
ALBUMIN: 4.4 G/DL (ref 3.6–5.1)
ALKALINE PHOSPHATASE: 87 U/L (ref 35–144)
ALT: 17 U/L (ref 9–46)
AST: 18 U/L (ref 10–35)
BASOPHILS: 1 %
BILIRUBIN, TOTAL: 0.4 MG/DL (ref 0.2–1.2)
BUN: 20 MG/DL (ref 7–25)
CALCIUM: 9.4 MG/DL (ref 8.6–10.3)
CARBON DIOXIDE: 24 MMOL/L (ref 20–32)
CHLORIDE: 106 MMOL/L (ref 98–110)
CHOL/HDLC RATIO: 2.9 (CALC)
CHOLESTEROL, TOTAL: 209 MG/DL
CREATININE: 0.92 MG/DL (ref 0.7–1.35)
EGFR: 95 ML/MIN/1.73M2
EOSINOPHILS: 3.8 %
GLOBULIN: 2.6 G/DL (CALC) (ref 1.9–3.7)
GLUCOSE: 105 MG/DL (ref 65–99)
HDL CHOLESTEROL: 71 MG/DL
HEMATOCRIT: 41.5 % (ref 38.5–50)
HEMOGLOBIN: 13.6 G/DL (ref 13.2–17.1)
LDL-CHOLESTEROL: 113 MG/DL (CALC)
LYMPHOCYTES: 19.6 %
MCH: 30.7 PG (ref 27–33)
MCHC: 32.8 G/DL (ref 32–36)
MCV: 93.7 FL (ref 80–100)
MONOCYTES: 7.6 %
MPV: 9.2 FL (ref 7.5–12.5)
NEUTROPHILS: 68 %
NON-HDL CHOLESTEROL: 138 MG/DL (CALC)
PLATELET COUNT: 250 THOUSAND/UL (ref 140–400)
POTASSIUM: 4.4 MMOL/L (ref 3.5–5.3)
PROTEIN, TOTAL: 7 G/DL (ref 6.1–8.1)
PSA, TOTAL: 1.74 NG/ML
RDW: 13 % (ref 11–15)
RED BLOOD CELL COUNT: 4.43 MILLION/UL (ref 4.2–5.8)
SODIUM: 140 MMOL/L (ref 135–146)
TRIGLYCERIDES: 141 MG/DL
TSH W/REFLEX TO FT4: 3.11 MIU/L (ref 0.4–4.5)
WHITE BLOOD CELL COUNT: 8 THOUSAND/UL (ref 3.8–10.8)

## 2025-06-18 ENCOUNTER — OFFICE VISIT (OUTPATIENT)
Dept: NEUROLOGY | Facility: CLINIC | Age: 60
End: 2025-06-18
Payer: COMMERCIAL

## 2025-06-18 VITALS
RESPIRATION RATE: 16 BRPM | HEART RATE: 74 BPM | BODY MASS INDEX: 27 KG/M2 | SYSTOLIC BLOOD PRESSURE: 133 MMHG | DIASTOLIC BLOOD PRESSURE: 77 MMHG | WEIGHT: 187 LBS

## 2025-06-18 DIAGNOSIS — G25.0 ESSENTIAL TREMOR: ICD-10-CM

## 2025-06-18 PROCEDURE — 99213 OFFICE O/P EST LOW 20 MIN: CPT | Performed by: OTHER

## 2025-06-18 RX ORDER — PRIMIDONE 50 MG/1
TABLET ORAL
Qty: 270 TABLET | Refills: 3 | Status: SHIPPED | OUTPATIENT
Start: 2025-06-18
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
## 2025-06-18 NOTE — PROGRESS NOTES
Pt reports increased Primidone from LOV has helped with tremors.          The following individual(s) verbally consented to be recorded using ambient AI listening technology and understand that they can each withdraw their consent to this listening technology at any point by asking the clinician to turn off or pause the recording:    Patient name: Dex Hursttia

## 2025-06-26 ENCOUNTER — OFFICE VISIT (OUTPATIENT)
Facility: LOCATION | Age: 60
End: 2025-06-26
Payer: COMMERCIAL

## 2025-06-26 DIAGNOSIS — J34.829 NASAL VALVE COLLAPSE: ICD-10-CM

## 2025-06-26 DIAGNOSIS — J34.89 NASAL OBSTRUCTION: Primary | ICD-10-CM

## 2025-06-26 PROCEDURE — 99024 POSTOP FOLLOW-UP VISIT: CPT | Performed by: STUDENT IN AN ORGANIZED HEALTH CARE EDUCATION/TRAINING PROGRAM

## 2025-06-26 NOTE — PROGRESS NOTES
POSTOPERATIVE VISIT     60 year-old M who underwent septoturb, external nasal valve repair on 5/23. The patient tolerated the procedure well without issues.      The patient returns for post-op follow-up and is doing well.  He reports significant improvement in nasal obstruction.  Remaining constitutional symptoms are unremarkable.      Physical Exam:  General - The patient is normocephalic and atraumatic  Eyes - Extraocular motions are intact, vision is intact  Nose - Nose is midline, no external trauma, septum midline, turbinates well reduced  Oral cavity- Lips are normal  Neuro - Alert with normal affect  CN - 2-12 grossly intact and symmetric bilaterally     Plan:  - Nasal obstruction improved, Flonase as needed for seasonal allergic rhinitis  -Follow-up as needed     Ann Yeung MD, CARISSA  Facial Plastic & Reconstructive Surgery, Otolaryngology-Head & Neck Surgery  [facility]

## 2025-06-29 ENCOUNTER — PATIENT MESSAGE (OUTPATIENT)
Dept: INTERNAL MEDICINE CLINIC | Facility: CLINIC | Age: 60
End: 2025-06-29

## 2025-07-01 ENCOUNTER — TELEPHONE (OUTPATIENT)
Facility: LOCATION | Age: 60
End: 2025-07-01

## 2025-07-01 ENCOUNTER — NURSE TRIAGE (OUTPATIENT)
Dept: INTERNAL MEDICINE CLINIC | Facility: CLINIC | Age: 60
End: 2025-07-01

## 2025-07-01 NOTE — TELEPHONE ENCOUNTER
Action Requested: Summary for Provider     []  Critical Lab, Recommendations Needed  [] Need Additional Advice  []   FYI    []   Need Orders  [] Need Medications Sent to Pharmacy  []  Other     SUMMARY: Spoke to patient, states that he has had an difficult time with intimacy with his wife and cramping to bilateral legs and hands. Patient believes it has to do with his medication. Patient states that this started 2 months ago. Cramping is worse to his bilateral legs. Denies redness to bilateral legs or swelling. Denies fever. Patient states that he has tried to hydrate more. Has not had any diarrhea/vomiting. Appointment made for 7/2/25 with Nicky to discuss medication changes. Informed patient that he would have to take medication as prescribed until his visit with Nicky to discuss potential new medication, patient verbalizes understanding.     Reason for call: Musculoskeletal Problem  Onset: 2 months ago                   Reason for Disposition   Leg pain or muscle cramp is a chronic symptom (recurrent or ongoing AND lasting > 4 weeks)    Protocols used: Leg Pain-A-[place]

## 2025-07-01 NOTE — TELEPHONE ENCOUNTER
Faxed request for records update to PCP clinic    Spoke with PCP clinic, they have no records from patient's [place] provider and no copy of historical angiogram  Spoke with patient, he states he was seen at [facility] for angiogram ([id number]?)  Patient was seen for chest pain - told his arteries were clear.  Faxed request for records     See triage encounter 7/1/25.

## 2025-07-02 ENCOUNTER — OFFICE VISIT (OUTPATIENT)
Dept: INTERNAL MEDICINE CLINIC | Facility: CLINIC | Age: 60
End: 2025-07-02
Payer: COMMERCIAL

## 2025-07-02 VITALS
TEMPERATURE: 99 F | HEART RATE: 69 BPM | BODY MASS INDEX: 27.77 KG/M2 | OXYGEN SATURATION: 98 % | SYSTOLIC BLOOD PRESSURE: 124 MMHG | DIASTOLIC BLOOD PRESSURE: 76 MMHG | RESPIRATION RATE: 16 BRPM | WEIGHT: 194 LBS | HEIGHT: 70 IN

## 2025-07-02 DIAGNOSIS — Z98.890 HISTORY OF LUMBAR LAMINECTOMY FOR SPINAL CORD DECOMPRESSION: ICD-10-CM

## 2025-07-02 DIAGNOSIS — N40.0 BENIGN PROSTATIC HYPERPLASIA WITHOUT LOWER URINARY TRACT SYMPTOMS: ICD-10-CM

## 2025-07-02 DIAGNOSIS — R73.9 HYPERGLYCEMIA: ICD-10-CM

## 2025-07-02 DIAGNOSIS — Z00.00 ROUTINE GENERAL MEDICAL EXAMINATION AT A HEALTH CARE FACILITY: Primary | ICD-10-CM

## 2025-07-02 DIAGNOSIS — M54.16 LUMBAR RADICULITIS: ICD-10-CM

## 2025-07-02 DIAGNOSIS — G25.0 ESSENTIAL TREMOR: ICD-10-CM

## 2025-07-02 DIAGNOSIS — M54.50 ACUTE RIGHT-SIDED LOW BACK PAIN, UNSPECIFIED WHETHER SCIATICA PRESENT: ICD-10-CM

## 2025-07-02 DIAGNOSIS — E78.5 DYSLIPIDEMIA: ICD-10-CM

## 2025-07-02 DIAGNOSIS — N52.9 ERECTILE DYSFUNCTION, UNSPECIFIED ERECTILE DYSFUNCTION TYPE: ICD-10-CM

## 2025-07-02 DIAGNOSIS — I10 ESSENTIAL HYPERTENSION: ICD-10-CM

## 2025-07-02 DIAGNOSIS — R22.9 SKIN MASS: ICD-10-CM

## 2025-07-02 DIAGNOSIS — F41.9 ANXIETY: ICD-10-CM

## 2025-07-02 PROBLEM — K64.1 GRADE II HEMORRHOIDS: Status: RESOLVED | Noted: 2023-09-29 | Resolved: 2025-07-02

## 2025-07-02 PROBLEM — R15.1 FECAL SMEARING: Status: RESOLVED | Noted: 2023-09-29 | Resolved: 2025-07-02

## 2025-07-02 PROCEDURE — 99214 OFFICE O/P EST MOD 30 MIN: CPT | Performed by: NURSE PRACTITIONER

## 2025-07-02 PROCEDURE — 99396 PREV VISIT EST AGE 40-64: CPT | Performed by: NURSE PRACTITIONER

## 2025-07-02 RX ORDER — TADALAFIL 10 MG/1
10 TABLET ORAL
Qty: 30 TABLET | Refills: 0 | Status: SHIPPED | OUTPATIENT
Start: 2025-07-02 | End: 2025-07-07
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
## 2025-07-07 RX ORDER — TADALAFIL 5 MG/1
5 TABLET ORAL DAILY
Qty: 90 TABLET | Refills: 1 | Status: SHIPPED | OUTPATIENT
Start: 2025-07-07

## 2025-07-07 RX ORDER — TAMSULOSIN HYDROCHLORIDE 0.4 MG/1
0.4 CAPSULE ORAL EVERY EVENING
COMMUNITY
Start: 2025-07-07

## 2025-07-11 ENCOUNTER — HOSPITAL ENCOUNTER (OUTPATIENT)
Dept: ULTRASOUND IMAGING | Facility: HOSPITAL | Age: 60
Discharge: HOME | End: 2025-07-11
Attending: NURSE PRACTITIONER
Payer: COMMERCIAL

## 2025-07-11 DIAGNOSIS — M54.50 ACUTE RIGHT-SIDED LOW BACK PAIN, UNSPECIFIED WHETHER SCIATICA PRESENT: ICD-10-CM

## 2025-07-11 DIAGNOSIS — R22.9 SKIN MASS: ICD-10-CM

## 2025-07-11 PROCEDURE — 76705 ECHO EXAM OF ABDOMEN: CPT | Performed by: NURSE PRACTITIONER

## 2025-07-21 ENCOUNTER — HOSPITAL ENCOUNTER (OUTPATIENT)
Age: 60
Discharge: HOME | End: 2025-07-21
Payer: COMMERCIAL

## 2025-07-21 ENCOUNTER — APPOINTMENT (OUTPATIENT)
Dept: GENERAL RADIOLOGY | Age: 60
End: 2025-07-21
Attending: NURSE PRACTITIONER
Payer: COMMERCIAL

## 2025-07-21 VITALS
HEIGHT: 70 IN | SYSTOLIC BLOOD PRESSURE: 145 MMHG | RESPIRATION RATE: 20 BRPM | HEART RATE: 73 BPM | TEMPERATURE: 99 F | DIASTOLIC BLOOD PRESSURE: 81 MMHG | BODY MASS INDEX: 26.77 KG/M2 | OXYGEN SATURATION: 97 % | WEIGHT: 187 LBS

## 2025-07-21 DIAGNOSIS — S92.502A CLOSED FRACTURE OF PHALANX OF LEFT FOURTH TOE, INITIAL ENCOUNTER: ICD-10-CM

## 2025-07-21 DIAGNOSIS — S92.502A CLOSED FRACTURE OF PHALANX OF LEFT THIRD TOE, INITIAL ENCOUNTER: Primary | ICD-10-CM

## 2025-07-21 PROCEDURE — 73630 X-RAY EXAM OF FOOT: CPT | Performed by: NURSE PRACTITIONER

## 2025-07-21 PROCEDURE — 28510 TREATMENT OF TOE FRACTURE: CPT

## 2025-07-21 PROCEDURE — 99214 OFFICE O/P EST MOD 30 MIN: CPT

## 2025-07-21 PROCEDURE — 99213 OFFICE O/P EST LOW 20 MIN: CPT

## 2025-07-21 RX ORDER — ACETAMINOPHEN 500 MG
1000 TABLET ORAL ONCE
Status: COMPLETED | OUTPATIENT
Start: 2025-07-21 | End: 2025-07-21

## 2025-07-21 NOTE — ED INITIAL ASSESSMENT (HPI)
Pt states with his right foot he kicked some furniture last night. Right upper foot has been in constant pain

## 2025-07-21 NOTE — DISCHARGE INSTRUCTIONS
Ice and elevate.  Tylenol or ibuprofen as needed for pain.  Call the Ortho  line to arrange follow-up.  Return for any concerns or
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
## 2025-07-23 ENCOUNTER — OFFICE VISIT (OUTPATIENT)
Dept: PODIATRY CLINIC | Facility: CLINIC | Age: 60
End: 2025-07-23

## 2025-07-23 DIAGNOSIS — S92.501A: ICD-10-CM

## 2025-07-23 DIAGNOSIS — S92.501A: Primary | ICD-10-CM

## 2025-07-23 PROCEDURE — 99214 OFFICE O/P EST MOD 30 MIN: CPT | Performed by: PODIATRIST

## 2025-07-23 RX ORDER — HYDROCODONE BITARTRATE AND ACETAMINOPHEN 5; 325 MG/1; MG/1
1 TABLET ORAL EVERY 4 HOURS PRN
Qty: 20 TABLET | Refills: 0 | Status: SHIPPED | OUTPATIENT
Start: 2025-07-23 | End: 2025-07-30
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
## 2025-07-31 RX ORDER — TADALAFIL 10 MG/1
10 TABLET ORAL DAILY PRN
Qty: 30 TABLET | Refills: 0 | OUTPATIENT
Start: 2025-07-31

## 2025-08-04 RX ORDER — AMLODIPINE BESYLATE 10 MG/1
10 TABLET ORAL DAILY
Qty: 90 TABLET | Refills: 3 | Status: SHIPPED | OUTPATIENT
Start: 2025-08-04

## 2025-08-20 ENCOUNTER — HOSPITAL ENCOUNTER (OUTPATIENT)
Dept: GENERAL RADIOLOGY | Age: 60
Discharge: HOME | End: 2025-08-20
Attending: PODIATRIST

## 2025-08-20 DIAGNOSIS — S92.501A: ICD-10-CM

## 2025-08-20 DIAGNOSIS — S92.501A: Primary | ICD-10-CM

## 2025-08-20 PROCEDURE — 73630 X-RAY EXAM OF FOOT: CPT | Performed by: PODIATRIST

## 2025-08-25 ENCOUNTER — OFFICE VISIT (OUTPATIENT)
Dept: PODIATRY CLINIC | Facility: CLINIC | Age: 60
End: 2025-08-25

## 2025-08-25 DIAGNOSIS — S92.501A: Primary | ICD-10-CM

## 2025-08-25 DIAGNOSIS — S92.501A: ICD-10-CM

## 2025-08-25 PROCEDURE — 99214 OFFICE O/P EST MOD 30 MIN: CPT | Performed by: PODIATRIST

## (undated) DEVICE — DALE NASAL DRESSING HOLDER, FITS MOST: Brand: DALE NASAL DRESSING HOLDER

## (undated) DEVICE — GOWN SURG AERO CHROME XXL

## (undated) DEVICE — SUTURE ETHILON 3-0 PS-1

## (undated) DEVICE — DRAPE,U/SHT,SPLIT,FILM,60X84,STERILE: Brand: MEDLINE

## (undated) DEVICE — STERILE POLYISOPRENE POWDER-FREE SURGICAL GLOVES: Brand: PROTEXIS

## (undated) DEVICE — SOLUTION IRRIG 1000ML 0.9% NACL USP BTL

## (undated) DEVICE — SYRINGE MED 10ML LL CTRL W/ FNGR GRP CLR BRL

## (undated) DEVICE — NON-ADHERENT STRIPS,OIL EMULSION: Brand: CURITY

## (undated) DEVICE — SLEEVE COMPR MD KNEE LEN SGL USE KENDALL SCD

## (undated) DEVICE — TUBING IRR 16FT CNT WV 3 ASCP

## (undated) DEVICE — SUPER SPONGES,MEDIUM: Brand: KERLIX

## (undated) DEVICE — BETADINE OINTMENT 1 32 OZ PKT

## (undated) DEVICE — SUT PROL 3-0 18IN PS-1 NABSRB BLU L24MM 3/8 C

## (undated) DEVICE — GLOVE SUR 6.5 SENSICARE PI PIP CRM PWD F

## (undated) DEVICE — HANDLE LIGHT ECONOMY

## (undated) DEVICE — SHOULDER ARTHROSCOPY CDS-LF: Brand: MEDLINE INDUSTRIES, INC.

## (undated) DEVICE — REFLEX ULTRA PTR WITH INTEGRATED CABLE: Brand: COBLATION

## (undated) DEVICE — [AGGRESSIVE PLUS CUTTER, ARTHROSCOPIC SHAVER BLADE,  DO NOT RESTERILIZE,  DO NOT USE IF PACKAGE IS DAMAGED,  KEEP DRY,  KEEP AWAY FROM SUNLIGHT]: Brand: FORMULA

## (undated) DEVICE — PACK CDS SINUS

## (undated) DEVICE — Device

## (undated) DEVICE — SUT PROL 2-0 30IN CT-2 NABSRB BLU L26MM 1/2 C

## (undated) DEVICE — SHEET,DRAPE,70X100,STERILE: Brand: MEDLINE

## (undated) DEVICE — SHOULDER TRACTION KIT AR-1635

## (undated) DEVICE — Device: Brand: DENVER SPLINT

## (undated) DEVICE — WRAP COOLING SHLDR W/ICE PILLO

## (undated) DEVICE — NEEDLE SPINAL 18X3-1/2 PINK.

## (undated) DEVICE — KENDALL SCD EXPRESS SLEEVES, KNEE LENGTH, MEDIUM: Brand: KENDALL SCD

## (undated) DEVICE — VAPR COOLPULSE 90 ELECTRODE WITH HAND CONTROLS 90 DEGREES SUCTION WITH INTEGRATED HANDPIECE: Brand: VAPR COOLPULSE

## (undated) DEVICE — SOL LACT RINGERS 3000ML

## (undated) DEVICE — ZZ-DISC-SUB-420797-H2SUT CHRM GUT 5-0 18IN ABSRB UD 13MM P-3-ZZDISC-SUB 420797

## (undated) DEVICE — [AGGRESSIVE 6-FLUTE BARREL BUR, ARTHROSCOPIC SHAVER BLADE,  DO NOT RESTERILIZE,  DO NOT USE IF PACKAGE IS DAMAGED,  KEEP DRY,  KEEP AWAY FROM SUNLIGHT]: Brand: FORMULA

## (undated) DEVICE — CHLORAPREP 26ML APPLICATOR

## (undated) NOTE — LETTER
17    Patient: Bharathi Palafox  : 2/3/1965 Visit date: 2017    Dear  Dr. Craig rCaven MD,    Thank you for referring Bharathi Palafox to my practice. Please find my assessment and plan below.                 Assessment   Encounter for screening

## (undated) NOTE — LETTER
23    Patient: Jone Gunderson  : 2/3/1965 Visit date: 2023    Dear  Love Sheldon MD    Thank you for referring Jone Gunderson to my practice. Please find my assessment and plan below. Assessment   Fecal smearing  (primary encounter diagnosis)  Grade II hemorrhoids  This is a very nice 69-year-old gentleman who I met on 2023 with concerns about seepage and fecal smearing in his undergarments. Patient endorses seepage of mucus and malodorous liquid with fecal particles most days over the last year. He generally has 2 bowel movements a day and states he consumes a high-fiber diet. He does endorse occasional pain with wiping. No prolapse. He does endorse occasional slight bleeding and itching after bowel movements. No prior anorectal surgery. He admits to consuming a large amount of caffeine, peppers and spicy foods. He does not drink alcohol. He works as a . Dr. Madi Frankel performed his most recent colonoscopy on 2022. Dr. Madi Frankel discovered grade 3 internal hemorrhoids, otherwise normal colon and rectum. Patient should have a repeat colonoscopy in 5 years as his father has a history of colon cancer. I performed rubber band ligation and sclerotherapy to a large left lateral internal hemorrhoid on 10/12/2023. Patient returns for follow-up today. Patient feels the seepage is overall better. He did have some after eating spicy Mediterranean food. He denies any pain, bleeding, prolapse or incontinence. He does endorse occasional itching especially after a bout of prolonged wiping. External exam of the anus is normal.  Digital rectal exam shows fair tone without any masses, bleeding or drainage. Anoscopy reveals small residual grade 1 right anterior and right posterior internal hemorrhoids    Plan   I suggest continuing to cutting down on foods that are known to exacerbate seepage and itching including caffeine, beer, chocolate and spice. Avoid over-wiping. He can try using a gauze pad or cotton ball to keep the perianal skin dry. I recommend against any further treatment of the residual internal hemorrhoids as they are rather small and I do not think they are significantly contributing to his seepage. No further follow-up scheduled, but patient is welcome to see me at anytime in the future with any surgical questions or concerns. He should have a repeat colonoscopy in November 2027.        Sincerely,       Shameka Wallace MD   CC:   No Recipients

## (undated) NOTE — LETTER
To Whom it may concern,    Patient Dex Jack,  2/3/1965 was seen in our office today 2025. Please excuse patient from work from 2025 through 2025. Patient is able to return to work without restrictions on 2025. If you have any questions/ concerns, please call our office at [phone].    Thank you,    Ann Yeung M.D. CARISSA

## (undated) NOTE — LETTER
23    Patient: Otto Pitts  : 2/3/1965 Visit date: 2023    Dear  Siobhan Holloway MD    Thank you for referring Otto Pitts to my practice. Please find my assessment and plan below. Assessment   Fecal smearing  (primary encounter diagnosis)  Grade II hemorrhoids    This is a very nice 62-year-old gentleman who presents to my office today with concerns about seepage and fecal smearing in his undergarments. Patient endorses seepage of mucus and malodorous liquid with fecal particles most days over the last year. He generally has 2 bowel movements a day and states he consumes a high-fiber diet. He does endorse occasional pain with wiping. No prolapse. He does endorse occasional slight bleeding and itching after bowel movements. No prior anorectal surgery. He admits to consuming a large amount of caffeine, peppers and spicy foods. He does not drink alcohol. He works as a . Dr. Sugar Hester performed his most recent colonoscopy on 2022. Dr. Sugar Hester discovered grade 3 internal hemorrhoids, otherwise normal colon and rectum. Patient should have a repeat colonoscopy in 5 years as his father has a history of colon cancer. External exam of the anus is normal.  Digital rectal exam shows good tone without any masses, bleeding or drainage. Anoscopy reveals slightly prominent internal hemorrhoids, largest in the left lateral position and smaller in the right anterior and right posterior positions. I suspect his seepage is being caused by his copious consumption of caffeine and spicy foods in addition to somewhat enlarged internal hemorrhoids. Plan  I suggest trial of cutting down on foods that are known to exacerbate seepage and itching including caffeine, beer, chocolate and spice. I also offered patient further treatment of his internal hemorrhoids through rubber band ligation and sclerotherapy.   The details of this procedure were discussed including the expected recovery time, risks, benefits and alternatives. Patient expressed understanding and wished to follow-up with me for rubber band ligation and sclerotherapy in the next few weeks.       Sincerely,       Liu Rendon MD   CC:   No Recipients

## (undated) NOTE — LETTER
Patient Name: Chery Corrigan  : 2/3/1965  MRN: [id number]  Patient Address: [place] [id number]      Coronavirus Disease 2019 (COVID-19)     [place] [address] is committed to the safety and well-being of our patients, vesta carefully. If your symptoms get worse, call your healthcare provider immediately. 3. Get rest and stay hydrated.    4. If you have a medical appointment, call the healthcare provider ahead of time and tell them that you have or may have COVID-19.  5. For m of fever-reducing medications; and  · Improvement in respiratory symptoms (e.g., cough, shortness of breath); and  · At least 10 days have passed since symptoms first appeared OR if asymptomatic patient or date of symptom onset is unclear then use 10 days donors must:    · Have had a confirmed diagnosis of COVID-19  · Be symptom-free for at least 14 days*    *Some people will be required to have a repeat COVID-19 test in order to be eligible to donate.  If you’re instructed by Cecy that a repeat test is r

## (undated) NOTE — LETTER
To Whom It May Concern:    Dex Jack has been under our care regarding ongoing medical issues. Because of this, he has been required to restrict his physical activities.     He may resume his usual activities, including work, on 06/16/24 with the following restrictions:    [x]  None     []    No heavy lifting (over 15 pounds) for               weeks   []    Part-time (no more than             hours per week) for               week   []  Other:        Please feel free to contact us if there are any questions.      Sincerely,      Enrique Cesar MD  [facility] [address]  [facility] [place] [address] [phone]

## (undated) NOTE — LETTER
25          Dex Jack  :  2/3/1965      To Whom It May Concern:    This patient had nasal valve repair surgery on  25. Patient may return to work on 25, without any restrictions.   If this office may be of further assistance, please do not hesitate to contact us. [phone]      Sincerely,    Ann Yeung MD

## (undated) NOTE — LETTER
10/05/22    Patient: Sohail Khan  : 2/3/1965 Visit date: 10/4/2022    Dear  Mauricio Daniel MD    Thank you for referring Sohail Khan to my practice. Please find my assessment and plan below. Assessment   Tubular adenoma of colon  (primary encounter diagnosis)  Family history of colon cancer in father  Pure hypercholesterolemia  Essential hypertension      Plan   The patient presents today in consultation for a colonoscopy. The patient has had a prior colonoscopy. Dr. Watson Lomax performed his most recent colonoscopy on 2017. He found internal hemorrhoids, diverticulosis and tubular adenomas at that time. The patient denies diarrhea, constipation or alternating between the two. The patient denies having any blood, mucus or dark tarry stools. The patient denies having any narrowing of stools. The patient denies any unintentional weight loss. The patient denies any abdominal pain or distention. The patient denies fevers, chills, nausea or vomiting. The patient's father has a history of colon cancer. The patient denies any other first or second-degree relatives with a history of colon cancer/colon polyps. The patient denies any first or second-degree family history of uterine cancer. The patient has a past medical history significant for hypertension and hyperlipidemia. He takes 81 mg of aspirin daily. The patient has had 4 prior inguinal hernia repairs. Clinical examination of the abdomen reveals it to be soft, nondistended, nontender, bowel sounds are normal activity normal pitch. There  is no rebounding tenderness or guarding. There are no signs of ascites or peritonitis. The liver and spleen are nonpalpable. There are no palpable masses or hernias. He has well-healed laparoscopic incision sites. The patient will be scheduled to undergo a colonoscopy on 2022 at the [facility] for surgery.     The patient states that he typically has difficulty passing flatus following his bowel movements. I instructed him to remind me of this at the time of his colonoscopy, so I can use less air when exiting his colon. All risks, benefits, complications and alternatives to the proposed procedure(s) were fully discussed with the patient. All questions from the patient were answered in detail. A description of the procedure(s) possible outcomes was fully discussed. The patient seemed to understand the conversation and its details. Consent for the procedure(s) was confirmed with the patient.       Sincerely,       Johanny Block MD   CC: No Recipients

## (undated) NOTE — LETTER
AUTHORIZATION FOR SURGICAL OPERATION OR OTHER PROCEDURE    1. I hereby authorize Dr. Carlos Arteaga, and [facility] staff assigned to my case to perform the following operation and/or procedure at the [facility]:    _______________________________________________________________________________________________    Cortisone injection right foot  _______________________________________________________________________________________________    2. My physician has explained the nature and purpose of the operation or other procedure, possible alternative methods of treatment, the risks involved, and the possibility of complication to me. I acknowledge that no guarantee has been made as to the result that may be obtained. 3.  I recognize that, during the course of this operation, or other procedure, unforseen conditions may necessitate additional or different procedure than those listed above. I, therefore, further authorize and request that the above named physician, his/her physician assistants or designees perform such procedures as are, in his/her professional opinion, necessary and desirable. 4.  Any tissue or organs removed in the operation or other procedure may be disposed of by and at the discretion of the [facility] and [facility]. 5.  I understand that in the event of a medical emergency, I will be transported by local paramedics to [facility] or other hospital emergency department. 6.  I certify that I have read and fully understand the above consent to operation and/or other procedure. 7.  I acknowledge that my physician has explained sedation/analgesia administration to me including the risks and benefits. I consent to the administration of sedation/analgesia as may be necessary or desirable in the judgement of my physician.     Witness signature: ___________________________________________________ Date:  ______/______/_____                    Time: ________ A. M.  P.M. Patient Name:  ______________________________________________________  (please print)      Patient signature:  ___________________________________________________             Relationship to Patient:           []  Parent    Responsible person                          []  Spouse  In case of minor or                    [] Other  _____________   Incompetent name:  __________________________________________________                               (please print)      _____________      Responsible person  In case of minor or  Incompetent signature:  _______________________________________________    Statement of Physician  My signature below affirms that prior to the time of the procedure, I have explained to the patient and/or his/her guardian, the risks and benefits involved in the proposed treatment and any reasonable alternative to the proposed treatment. I have also explained the risks and benefits involved in the refusal of the proposed treatment and have answered the patient's questions.                         Date:  ______/______/_______  Provider                      Signature:  __________________________________________________________       Time:  ___________ A.M    P.M.

## (undated) NOTE — LETTER
6/13/2023  [place] [address]anda [address]    Dear Manoj [facility],      My office staff has attempted to contact you at my request.  It is important for your health and well-being that you contact my office for the following reason(s):    __X_ Schedule office visit/office procedure    __X_ Schedule laboratory/radiology testing    ___ Call to discuss results of testing and/or be advised of treatment changes      ___      Other:      I believe that the relationship between a physician and their patient is one of communication and mutual cooperation. It is important for the patient to follow through with the recommendations made by their Doctor in order to achieve the best possible outcome. Please contact our office at [phone].       Sincerely,    [facility], ELLA Acevedo

## (undated) NOTE — MR AVS SNAPSHOT
[facility]  [address] [address] [phone]               Thank you for choosing us for your health care visit with Doyle Gonsalves MD.  We are glad to serve you and happy to provide you with this summary of yo view more details from this visit by going to [email or web address]. If you've recently had a stay at the Hospital you can access your discharge instructions in [employer] by going to Visits < Admission Summaries.  If you've been to the Emergency Depar

## (undated) NOTE — LETTER
[address]  [address]  Authorization for Surgical Operation and Procedure     Date:___________                                                                                                         Time:__________  I hereby authorize Surgeon(s):  Ann Yeung MD, my physician and his/her assistants (if applicable), which may include medical students, residents, and/or fellows, to perform the following surgical operation/ procedure and administer such anesthesia as may be determined necessary by my physician:  Operation/Procedure name (s) Procedure(s):  Nasal Septoplasty; Nasal Valve Repair; Bilateral Out Fracture of Inferior Turbinates;  Bilateral Coblation Submucous Resection of Inferior Turbinates on Dex Hursttia   2.   I recognize that during the surgical operation/procedure, unforeseen conditions may necessitate additional or different procedures than those listed above.  I, therefore, further authorize and request that the above-named surgeon, assistants, or designees perform such procedures as are, in their judgment, necessary and desirable.    3.   My surgeon/physician has discussed prior to my surgery the potential benefits, risks and side effects of this procedure; the likelihood of achieving goals; and potential problems that might occur during recuperation.  They also discussed reasonable alternatives to the procedure, including risks, benefits, and side effects related to the alternatives and risks related to not receiving this procedure.  I have had all my questions answered and I acknowledge that no guarantee has been made as to the result that may be obtained.    4.   Should the need arise during my operation/procedure, which includes change of level of care prior to discharge, I also consent to the administration of blood and/or blood products.  Further, I understand that despite careful testing and screening of blood or blood products by collecting  agencies, I may still be subject to ill effects as a result of receiving a blood transfusion and/or blood products.  The following are some, but not all, of the potential risks that can occur: fever and allergic reactions, hemolytic reactions, transmission of diseases such as Hepatitis, AIDS and Cytomegalovirus (CMV) and fluid overload.  In the event that I wish to have an autologous transfusion of my own blood, or a directed donor transfusion, I will discuss this with my physician.  Check only if Refusing Blood or Blood Products  I understand refusal of blood or blood products as deemed necessary by my physician may have serious consequences to my condition to include possible death. I hereby assume responsibility for my refusal and release the hospital, its personnel, and my physicians from any responsibility for the consequences of my refusal.          o  Refuse      5.   I authorize the use of any specimen, organs, tissues, body parts or foreign objects that may be removed from my body during the operation/procedure for diagnosis, research or teaching purposes and their subsequent disposal by hospital authorities.  I also authorize the release of specimen test results and/or written reports to my treating physician on the hospital medical staff or other referring or consulting physicians involved in my care, at the discretion of the Pathologist or my treating physician.    6.   I consent to the photographing or videotaping of the operations or procedures to be performed, including appropriate portions of my body for medical, scientific, or educational purposes, provided my identity is not revealed by the pictures or by descriptive texts accompanying them.  If the procedure has been photographed/videotaped, the surgeon will obtain the original picture, image, videotape or CD.  The hospital will not be responsible for storage, release or maintenance of the picture, image, tape or CD.    7.   I consent to the  presence of a  or observers in the operating room as deemed necessary by my physician or their designees.    8.   I recognize that in the event my procedure results in extended X-Ray/fluoroscopy time, I may develop a skin reaction.    9. If I have a Do Not Attempt Resuscitation (DNAR) order in place, that status will be suspended while in the operating room, procedural suite, and during the recovery period unless otherwise explicitly stated by me (or a person authorized to consent on my behalf). The surgeon or my attending physician will determine when the applicable recovery period ends for purposes of reinstating the DNAR order.  10. Patients having a sterilization procedure: I understand that if the procedure is successful the results will be permanent and it will therefore be impossible for me to inseminate, conceive, or bear children.  I also understand that the procedure is intended to result in sterility, although the result has not been guaranteed.   11. I acknowledge that my physician has explained sedation/analgesia administration to me including the risk and benefits I consent to the administration of sedation/analgesia as may be necessary or desirable in the judgment of my physician.    I CERTIFY THAT I HAVE READ AND FULLY UNDERSTAND THE ABOVE CONSENT TO OPERATION and/or OTHER PROCEDURE.    _________________________________________  __________________________________  Signature of Patient     Signature of Responsible Person         ___________________________________         Printed Name of Responsible Person           _________________________________                 Relationship to Patient  _________________________________________  ______________________________  Signature of Witness          Date  Time      Patient Name: Dex Jack     : 2/3/1965                 Printed: May 23, 2025     Medical Record #: [id number]                     Page 1 of 2                                     [address]  [address]    Consent for Anesthesia    I, Dex Jack agree to be cared for by an anesthesiologist, who is specially trained to monitor me and give me medicine to put me to sleep or keep me comfortable during my procedure    I understand that my anesthesiologist is not an employee or agent of [facility] or [employer]. He or she works for [employer]    As the patient asking for anesthesia services, I agree to:  Allow the anesthesiologist (anesthesia doctor) to give me medicine and do additional procedures as necessary. Some examples are: Starting or using an “IV” to give me medicine, fluids or blood during my procedure, and having a breathing tube placed to help me breathe when I’m asleep (intubation). In the event that my heart stops working properly, I understand that my anesthesiologist will make every effort to sustain my life, unless otherwise directed by [facility] Do Not Resuscitate documents.  Tell my anesthesia doctor before my procedure:  If I am pregnant.  The last time that I ate or drank.  All of the medicines I take (including prescriptions, herbal supplements, and pills I can buy without a prescription (including street drugs/illegal medications). Failure to inform my anesthesiologist about these medicines may increase my risk of anesthetic complications.  If I am allergic to anything or have had a reaction to anesthesia before.  I understand how the anesthesia medicine will help me (benefits).  I understand that with any type of anesthesia medicine there are risks:  The most common risks are: nausea, vomiting, sore throat, muscle soreness, damage to my eyes, mouth, or teeth (from breathing tube placement).  Rare risks include: remembering what happened during my procedure, allergic reactions to medications, injury to my airway, heart, lungs, vision, nerves, or muscles and in extremely rare instances  death.  My doctor has explained to me other choices available to me for my care (alternatives).  Pregnant Patients (“epidural”):  I understand that the risks of having an epidural (medicine given into my back to help control pain during labor), include itching, low blood pressure, difficulty urinating, headache or slowing of the baby’s heart. Very rare risks include infection, bleeding, seizure, irregular heart rhythms and nerve injury.  Regional Anesthesia (“spinal”, “epidural”, & “nerve blocks”):  I understand that rare but potential complications include headache, bleeding, infection, seizure, irregular heart rhythms, and nerve injury.    I can change my mind about having anesthesia services at any time before I get the medicine.    _____________________________________________________________________________  Patient (or Representative) Signature/Relationship to Patient  Date   Time    _____________________________________________________________________________   Name (if used)    Language/Organization   Time    _____________________________________________________________________________  Anesthesiologist Signature     Date   Time  I have discussed the procedure and information above with the patient (or patient’s representative) and answered their questions. The patient or their representative has agreed to have anesthesia services.    _____________________________________________________________________________  Witness        Date   Time  I have verified that the signature is that of the patient or patient’s representative, and that it was signed before the procedure  Patient Name: Dex Jack     : 2/3/1965                 Printed: May 23, 2025     Medical Record #: [id number]                     Page 2 of 2

## (undated) NOTE — LETTER
7/23/2025          To Whom It May Concern:    Dex Jack is currently under my medical care and sustained an injury to his right lower extremity on 7/21/2025.  Patient presented to the emergency room at that time and then followed up with a specialist office today 7/23/2025.  Patient sustained multiple fractures to the digits of his right lower extremity.  Patient will need to be protected and immobilized for approximately 8 weeks pending on his healing.  Patient will potentially be able to return to work full duty will be 8 weeks from today as long as he heals uneventfully.    If you require additional information please contact our office.        Sincerely,        Geovanna Jackson DPM, D.ERNIE CHI  Diplomat, American Board of Foot and Ankle Surgery  Certified in Foot and Rearfoot/Ankle Reconstruction  Fellow of the American College of Foot and Ankle Surgeons  Fellowship Trained Foot and Ankle Surgeon   [facility]

## (undated) NOTE — Clinical Note
1/10/2017    Return to School / Work    Name: Jose Sweeney        : 2/3/1965    To Whom It May Concern,    Jose Sweeney had surgery on 16 and is:    Not able to return to school / work. He next office visit will be on 17.      Comments:

## (undated) NOTE — LETTER
7/23/2025          To Whom It May Concern:    Dex Jack is currently under my medical care and sustained an injury to his right lower extremity on 7/21/2025.  Patient presented to the emergency room at that time and then followed up with a specialist office today 7/23/2025.  Patient sustained multiple fractures to the digits of his right lower extremity.  Patient will need to be protected and immobilized for approximately 8 weeks pending on his healing.  Patient will potentially be able to return to work full duty on 9/21/25 as long as he heals uneventfully.    If you require additional information please contact our office.        Sincerely,        Geovanna Jackson DPM, D.ERNIE CHI  Diplomat, American Board of Foot and Ankle Surgery  Certified in Foot and Rearfoot/Ankle Reconstruction  Fellow of the American College of Foot and Ankle Surgeons  Fellowship Trained Foot and Ankle Surgeon   [facility]

## (undated) NOTE — MR AVS SNAPSHOT
[facility]  [address], [address] [address] [phone]               Thank you for choosing us for your health care visit with Cathlyn Goltz, MD.  We are glad to serve you and happy to provide you with this summary of yo view more details from this visit by going to [email or web address]. If you've recently had a stay at the Hospital you can access your discharge instructions in [employer] by going to Visits < Admission Summaries.  If you've been to the Emergency Depar

## (undated) NOTE — LETTER
Date: 1/29/2025    Patient Name: Dex Jack          To Whom it may concern:    The above patient was seen at [facility] for treatment of a medical condition.      Dex was evaluated for ear pain and hearing loss and is advised not to fly until seen by ENT on 2/5/25 or unless symptoms resolve prior to ENT consult which at that time he is advised to follow up for clearance.           Sincerely,    ELLA Carpio

## (undated) NOTE — MR AVS SNAPSHOT
[facility]  [address] [address] [phone]               Thank you for choosing us for your health care visit with Stefanie Noe MD.  We are glad to serve you and happy to provide you with this summa [employer] Dx:  Essential hypertension [I10], Pure hypercholesterolemia [E78.00], Palpitations [R00.2], Other chest pain [R07.89]                 Scheduling Instructions     Thursday May 11, 2017     Cardiology:  CARD ECHO 2D DOPPLER (CPT=93306)    Instruction discharge instructions in [employer] by going to Visits < Admission Summaries. If you've been to the Emergency Department or your doctor's office, you can view your past visit information in [employer] by going to Visits < Visit Summaries. [employer] questions?

## (undated) NOTE — Clinical Note
2/3/2017    Return to School / Work    Name: Ana Ibarra        : 2/3/1965    To Whom It May Concern,     Ana Ibarra had surgery on 2016 and is:    Able to return to school / work without restrictions on 02/10/2017.     Comments:     If th

## (undated) NOTE — Clinical Note
2017    Return to School / Work    Name: Frida Galicia        : 2/3/1965    To Whom It May Concern,    Frida Galicia had an office visit on 17 and is:    Not able to return to school / work.  His next follow up appointment is on 2/3/17 and